# Patient Record
Sex: MALE | Race: WHITE | NOT HISPANIC OR LATINO | Employment: OTHER | URBAN - METROPOLITAN AREA
[De-identification: names, ages, dates, MRNs, and addresses within clinical notes are randomized per-mention and may not be internally consistent; named-entity substitution may affect disease eponyms.]

---

## 2017-01-24 ENCOUNTER — OFFICE VISIT - RIVER FALLS (OUTPATIENT)
Dept: FAMILY MEDICINE | Facility: CLINIC | Age: 52
End: 2017-01-24
Payer: COMMERCIAL

## 2017-01-24 ASSESSMENT — MIFFLIN-ST. JEOR: SCORE: 2168.45

## 2017-01-31 ENCOUNTER — OFFICE VISIT - RIVER FALLS (OUTPATIENT)
Dept: FAMILY MEDICINE | Facility: CLINIC | Age: 52
End: 2017-01-31
Payer: COMMERCIAL

## 2017-01-31 ASSESSMENT — MIFFLIN-ST. JEOR: SCORE: 2164.83

## 2017-10-25 ENCOUNTER — OFFICE VISIT - RIVER FALLS (OUTPATIENT)
Dept: FAMILY MEDICINE | Facility: CLINIC | Age: 52
End: 2017-10-25
Payer: COMMERCIAL

## 2017-10-25 ASSESSMENT — MIFFLIN-ST. JEOR: SCORE: 2151.22

## 2017-10-26 LAB
CREAT SERPL-MCNC: 1.15 MG/DL (ref 0.7–1.33)
GLUCOSE BLD-MCNC: 99 MG/DL (ref 65–99)

## 2019-07-01 ENCOUNTER — COMMUNICATION - RIVER FALLS (OUTPATIENT)
Dept: FAMILY MEDICINE | Facility: CLINIC | Age: 54
End: 2019-07-01
Payer: COMMERCIAL

## 2019-07-02 ENCOUNTER — OFFICE VISIT - RIVER FALLS (OUTPATIENT)
Dept: FAMILY MEDICINE | Facility: CLINIC | Age: 54
End: 2019-07-02
Payer: COMMERCIAL

## 2019-11-04 ENCOUNTER — HEALTH MAINTENANCE LETTER (OUTPATIENT)
Age: 54
End: 2019-11-04

## 2019-11-07 ENCOUNTER — COMMUNICATION - RIVER FALLS (OUTPATIENT)
Dept: FAMILY MEDICINE | Facility: CLINIC | Age: 54
End: 2019-11-07
Payer: COMMERCIAL

## 2019-12-10 ENCOUNTER — OFFICE VISIT - RIVER FALLS (OUTPATIENT)
Dept: FAMILY MEDICINE | Facility: CLINIC | Age: 54
End: 2019-12-10
Payer: COMMERCIAL

## 2019-12-10 ASSESSMENT — MIFFLIN-ST. JEOR: SCORE: 2053.24

## 2020-06-24 ENCOUNTER — COMMUNICATION - RIVER FALLS (OUTPATIENT)
Dept: FAMILY MEDICINE | Facility: CLINIC | Age: 55
End: 2020-06-24
Payer: COMMERCIAL

## 2020-08-18 ENCOUNTER — COMMUNICATION - RIVER FALLS (OUTPATIENT)
Dept: FAMILY MEDICINE | Facility: CLINIC | Age: 55
End: 2020-08-18
Payer: COMMERCIAL

## 2020-11-22 ENCOUNTER — HEALTH MAINTENANCE LETTER (OUTPATIENT)
Age: 55
End: 2020-11-22

## 2021-01-14 ENCOUNTER — COMMUNICATION - RIVER FALLS (OUTPATIENT)
Dept: FAMILY MEDICINE | Facility: CLINIC | Age: 56
End: 2021-01-14
Payer: COMMERCIAL

## 2021-01-29 ENCOUNTER — COMMUNICATION - RIVER FALLS (OUTPATIENT)
Dept: FAMILY MEDICINE | Facility: CLINIC | Age: 56
End: 2021-01-29
Payer: COMMERCIAL

## 2021-07-15 PROCEDURE — 87641 MR-STAPH DNA AMP PROBE: CPT | Performed by: PATHOLOGY

## 2021-07-16 ENCOUNTER — LAB REQUISITION (OUTPATIENT)
Dept: LAB | Facility: CLINIC | Age: 56
End: 2021-07-16

## 2021-07-17 LAB
MRSA DNA SPEC QL NAA+PROBE: NEGATIVE
SA TARGET DNA: NEGATIVE

## 2021-09-08 ENCOUNTER — COMMUNICATION - RIVER FALLS (OUTPATIENT)
Dept: FAMILY MEDICINE | Facility: CLINIC | Age: 56
End: 2021-09-08
Payer: COMMERCIAL

## 2021-09-18 ENCOUNTER — HEALTH MAINTENANCE LETTER (OUTPATIENT)
Age: 56
End: 2021-09-18

## 2022-01-08 ENCOUNTER — HEALTH MAINTENANCE LETTER (OUTPATIENT)
Age: 57
End: 2022-01-08

## 2022-02-11 VITALS
SYSTOLIC BLOOD PRESSURE: 130 MMHG | DIASTOLIC BLOOD PRESSURE: 92 MMHG | TEMPERATURE: 98.5 F | WEIGHT: 294.8 LBS | HEART RATE: 88 BPM | WEIGHT: 294 LBS | BODY MASS INDEX: 42.2 KG/M2 | HEIGHT: 70 IN | BODY MASS INDEX: 42.09 KG/M2 | HEIGHT: 70 IN

## 2022-02-12 VITALS
BODY MASS INDEX: 38.57 KG/M2 | WEIGHT: 269.4 LBS | OXYGEN SATURATION: 97 % | HEART RATE: 87 BPM | DIASTOLIC BLOOD PRESSURE: 76 MMHG | HEIGHT: 70 IN | SYSTOLIC BLOOD PRESSURE: 112 MMHG

## 2022-02-12 VITALS
DIASTOLIC BLOOD PRESSURE: 84 MMHG | SYSTOLIC BLOOD PRESSURE: 128 MMHG | TEMPERATURE: 98.2 F | HEIGHT: 70 IN | BODY MASS INDEX: 41.66 KG/M2 | WEIGHT: 291 LBS | HEART RATE: 100 BPM

## 2022-02-12 VITALS — DIASTOLIC BLOOD PRESSURE: 72 MMHG | SYSTOLIC BLOOD PRESSURE: 100 MMHG | WEIGHT: 261 LBS | BODY MASS INDEX: 37.45 KG/M2

## 2022-02-16 NOTE — TELEPHONE ENCOUNTER
Entered by Priti Barr CMA on January 29, 2021 12:27:38 PM CST  ---------------------  From: Priti Barr CMA   To: [facility]    Sent: 1/29/2021 12:27:37 PM CST  Subject: Medication Management     ** Not Approved: Prescriber not associated with location, resend prescriptions under Dr Vinicio Mcleod. **  hydrOXYzine (HYDROXYZINE PAMOATE 50MG CAPSULE)  TAKE 1 CAPSULE BY MOUTH EVERY EIGHT HOURS AS NEEDED  Qty:  90 EA        Days Supply:  90        Refills:  0          Substitutions Allowed     Route To Pharmacy - [facility]   Signed by Priti Barr CMA    ** Not Approved: Prescriber not associated with location, resend prescriptions under Dr Vinicio Mcleod. **  naltrexone (NALTREXONE HCL 50MG TABLET)  TAKE ONE TABLET BY MOUTH ONCE DAILY.  Qty:  30 EA        Days Supply:  30        Refills:  0          Substitutions Allowed     Route To Pharmacy - [facility]   Signed by Priti Barr CMA    ** Not Approved: Prescriber not associated with location, resend prescriptions under Dr Vinicio Mcleod. **  gabapentin (GABAPENTIN 300MG CAPSULE)  TAKE 1 CAPSULE BY MOUTH 3 TIMES A DAY  Qty:  90 EA        Days Supply:  30        Refills:  0          Substitutions Allowed     Route To Pharmacy - [facility]   Signed by Priti Barr CMA    ** Not Approved: Prescriber not associated with location, resend prescriptions under Dr Vinicio Mcleod. **  furosemide (FUROSEMIDE 40MG TABLET)  TAKE 1 TABLET BY MOUTH EVERY MORNING  Qty:  30 EA        Days Supply:  30        Refills:  0          Substitutions Allowed     Route To Pharmacy - [facility]   Signed by Priti Barr CMA    ** Not Approved: Prescriber not associated with location, resend prescriptions under Dr Vinicio Mcleod. **  FLUoxetine (FLUOXETINE HCL 20MG CAPSULE)  TAKE THREE CAPSULES BY MOUTH EVERY MORNING  Qty:  90 EA        Days Supply:  30        Refills:  0          Substitutions Allowed     Route To Pharmacy - [facility]   Signed by Priti Barr CMA    ** Not  Approved: Prescriber not associated with location, resend prescriptions under Dr Vinicio Mcleod. **  acetaminophen (NON-ASPIRIN PAIN RELIEVER 325MG TABLET)  TAKE 2 TABLETS BY MOUTH EVERY FOUR HOURS AS NEEDED  Qty:  60 EA        Days Supply:  60        Refills:  0          Substitutions Allowed     Route To Pharmacy - [facility]   Signed by Priti Barr CMA    ** Not Approved: Prescriber not associated with location, resend prescriptions under Dr Vinicio Mcleod. **  folic acid (FOLIC ACID 1000MCG TABLET)  TAKE ONE TABLET BY MOUTH ONCE DAILY  Qty:  30 EA        Days Supply:  30        Refills:  0          Substitutions Allowed     Route To Pharmacy - [facility]   Signed by Priti Barr CMA    ** Not Approved: Prescriber not associated with location, resend prescriptions under Dr Vinicio Mcleod. **  Miscellaneous Prescription (B-COMPLEX W/C  TABLET)  TAKE 1 TABLET BY MOUTH ONCE DAILY  Qty:  30 EA        Days Supply:  30        Refills:  0          Substitutions Allowed     Route To Pharmacy - [facility]   Signed by Priti Barr CMA    ** Not Approved: Prescriber not associated with location, resend prescriptions under Dr Vinicio Mcleod. **  ascorbic acid-carbonyl iron (VITRON-C  TABLET)  TAKE 1 TABLET BY MOUTH TWICE DAILY FOR HEMOGLOBIN  Qty:  60 EA        Days Supply:  30        Refills:  0          Substitutions Allowed     Route To Pharmacy - [facility]   Signed by Priti Barr CMA    ** Not Approved: Prescriber not associated with location, resend prescriptions under Dr Vinicio Mcleod. **  omeprazole (OMEPRAZOLE 20MG CAPSULE )  TAKE 1 CAPSULE BY MOUTH TWICE DAILY BEFORE MEALS  Qty:  60 EA        Days Supply:  30        Refills:  0          Substitutions Allowed     Route To Pharmacy - [facility]   Signed by Priti Barr CMA    ** Not Approved: Prescriber not associated with location, resend prescriptions under Dr Vinicio Mcleod. **  propranolol (PROPRANOLOL HYDROCHLORIDE 10MG TABLET)  TAKE ONE TABLET  BY MOUTH TWICE DAILY  Qty:  60 EA        Days Supply:  30        Refills:  0          Substitutions Allowed     Route To Pharmacy - [facility]   Signed by Priti Barr CMA    ** Not Approved: Prescriber not associated with location, resend prescriptions under Dr Vinicio Mcleod. **  gabapentin (GABAPENTIN 300MG CAPSULE)  TAKE ONE CAPSULE BY MOUTH THREE TIMES A DAY  Qty:  270 EA        Days Supply:  90        Refills:  1          Substitutions Allowed     Route To Pharmacy - [facility]   Signed by Priti Barr CMA            ** Patient matched by Priti Barr CMA on 1/29/2021 12:26:03 PM CST **      ------------------------------------------  From: [facility]  To: Cece Pettit MD  Sent: January 29, 2021 12:18:28 PM CST  Subject: Medication Management  Due: January 22, 2021 9:58:09 AM CST     ** On Hold Pending Signature **     Drug: hydrOXYzine (hydrOXYzine pamoate 50 mg oral capsule), TAKE 1 CAPSULE BY MOUTH EVERY EIGHT HOURS AS NEEDED  Quantity: 90 EA  Days Supply: 90  Refills: 0  Substitutions Allowed  Notes from Pharmacy:     Dispensed Drug: hydrOXYzine (hydrOXYzine pamoate 50 mg oral capsule), TAKE 1 CAPSULE BY MOUTH EVERY EIGHT HOURS AS NEEDED  Quantity: 90 EA  Days Supply: 90  Refills: 0  Substitutions Allowed  Notes from Pharmacy:     ** On Hold Pending Signature **     Drug: naltrexone (naltrexone 50 mg oral tablet), TAKE ONE TABLET BY MOUTH ONCE DAILY.  Quantity: 30 EA  Days Supply: 30  Refills: 0  Substitutions Allowed  Notes from Pharmacy:     Dispensed Drug: naltrexone (naltrexone 50 mg oral tablet), TAKE ONE TABLET BY MOUTH ONCE DAILY.  Quantity: 30 EA  Days Supply: 30  Refills: 0  Substitutions Allowed  Notes from Pharmacy:     ** On Hold Pending Signature **     Drug: gabapentin (gabapentin 300 mg oral capsule), TAKE 1 CAPSULE BY MOUTH 3 TIMES A DAY  Quantity: 90 EA  Days Supply: 30  Refills: 0  Substitutions Allowed  Notes from Pharmacy:     Dispensed Drug: gabapentin  (gabapentin 300 mg oral capsule), TAKE 1 CAPSULE BY MOUTH 3 TIMES A DAY  Quantity: 90 EA  Days Supply: 30  Refills: 0  Substitutions Allowed  Notes from Pharmacy:     ** On Hold Pending Signature **     Drug: furosemide (furosemide 40 mg oral tablet), TAKE 1 TABLET BY MOUTH EVERY MORNING  Quantity: 30 EA  Days Supply: 30  Refills: 0  Substitutions Allowed  Notes from Pharmacy:     Dispensed Drug: furosemide (furosemide 40 mg oral tablet), TAKE 1 TABLET BY MOUTH EVERY MORNING  Quantity: 30 EA  Days Supply: 30  Refills: 0  Substitutions Allowed  Notes from Pharmacy:     ** On Hold Pending Signature **     Drug: FLUoxetine (FLUoxetine 20 mg oral capsule), TAKE THREE CAPSULES BY MOUTH EVERY MORNING  Quantity: 90 EA  Days Supply: 30  Refills: 0  Substitutions Allowed  Notes from Pharmacy:     Dispensed Drug: FLUoxetine (FLUoxetine 20 mg oral capsule), TAKE THREE CAPSULES BY MOUTH EVERY MORNING  Quantity: 90 EA  Days Supply: 30  Refills: 0  Substitutions Allowed  Notes from Pharmacy:     ** On Hold Pending Signature **     Drug: acetaminophen (acetaminophen 325 mg oral tablet), TAKE 2 TABLETS BY MOUTH EVERY FOUR HOURS AS NEEDED  Quantity: 60 EA  Days Supply: 60  Refills: 0  Substitutions Allowed  Notes from Pharmacy:     Dispensed Drug: acetaminophen (acetaminophen 325 mg oral tablet), TAKE 2 TABLETS BY MOUTH EVERY FOUR HOURS AS NEEDED  Quantity: 60 EA  Days Supply: 60  Refills: 0  Substitutions Allowed  Notes from Pharmacy:     ** On Hold Pending Signature **     Drug: folic acid (folic acid 1 mg oral tablet), TAKE ONE TABLET BY MOUTH ONCE DAILY  Quantity: 30 EA  Days Supply: 30  Refills: 0  Substitutions Allowed  Notes from Pharmacy:     Dispensed Drug: folic acid (folic acid 1 mg oral tablet), TAKE ONE TABLET BY MOUTH ONCE DAILY  Quantity: 30 EA  Days Supply: 30  Refills: 0  Substitutions Allowed  Notes from Pharmacy:     ** On Hold Pending Signature **     Drug: B-COMPLEX W/C TABLET, TAKE 1 TABLET BY MOUTH ONCE  DAILY  Quantity: 30 EA  Days Supply: 30  Refills: 0  Substitutions Allowed  Notes from Pharmacy:     Dispensed Drug: B-COMPLEX W/C TABLET, TAKE 1 TABLET BY MOUTH ONCE DAILY  Quantity: 30 EA  Days Supply: 30  Refills: 0  Substitutions Allowed  Notes from Pharmacy:     ** On Hold Pending Signature **     Drug: ascorbic acid-carbonyl iron (Vitron-C 125 mg-65 mg oral tablet), TAKE 1 TABLET BY MOUTH TWICE DAILY FOR HEMOGLOBIN  Quantity: 60 EA  Days Supply: 30  Refills: 0  Substitutions Allowed  Notes from Pharmacy:     Dispensed Drug: ascorbic acid-carbonyl iron (Vitron-C 125 mg-65 mg oral tablet), TAKE 1 TABLET BY MOUTH TWICE DAILY FOR HEMOGLOBIN  Quantity: 60 EA  Days Supply: 30  Refills: 0  Substitutions Allowed  Notes from Pharmacy:     ** On Hold Pending Signature **     Drug: omeprazole (omeprazole 20 mg oral delayed release capsule), TAKE 1 CAPSULE BY MOUTH TWICE DAILY BEFORE MEALS  Quantity: 60 EA  Days Supply: 30  Refills: 0  Substitutions Allowed  Notes from Pharmacy:     Dispensed Drug: omeprazole (omeprazole 20 mg oral delayed release capsule), TAKE 1 CAPSULE BY MOUTH TWICE DAILY BEFORE MEALS  Quantity: 60 EA  Days Supply: 30  Refills: 0  Substitutions Allowed  Notes from Pharmacy:     ** On Hold Pending Signature **     Drug: propranolol (propranolol 10 mg oral tablet), TAKE ONE TABLET BY MOUTH TWICE DAILY  Quantity: 60 EA  Days Supply: 30  Refills: 0  Substitutions Allowed  Notes from Pharmacy:     Dispensed Drug: propranolol (propranolol 10 mg oral tablet), TAKE ONE TABLET BY MOUTH TWICE DAILY  Quantity: 60 EA  Days Supply: 30  Refills: 0  Substitutions Allowed  Notes from Pharmacy:     ** On Hold Pending Signature **     Drug: gabapentin (gabapentin 300 mg oral capsule), TAKE ONE CAPSULE BY MOUTH THREE TIMES A DAY  Quantity: 270 EA  Days Supply: 90  Refills: 1  Substitutions Allowed  Notes from Pharmacy:     Dispensed Drug: gabapentin (gabapentin 300 mg oral capsule), TAKE ONE CAPSULE BY MOUTH THREE TIMES  A DAY  Quantity: 270 EA  Days Supply: 90  Refills: 1  Substitutions Allowed  Notes from Pharmacy:  ------------------------------------------

## 2022-02-16 NOTE — NURSING NOTE
Comprehensive Intake Entered On:  7/2/2019 11:01 AM CDT    Performed On:  7/2/2019 10:56 AM CDT by Keiko Barnett CMA               Summary   Chief Complaint :   cardio consult   Advance Directive :   No   Weight Measured :   261 lb(Converted to: 261 lb 0 oz, 118.39 kg)    Systolic Blood Pressure :   100 mmHg   Diastolic Blood Pressure :   72 mmHg   Mean Arterial Pressure :   81 mmHg   BP Site :   Right arm   Pulse Site :   Radial artery   BP Method :   Manual   HR Method :   Electronic   Race :      Languages :   English   Ethnicity :   Not  or    Keiko Barnett CMA - 7/2/2019 10:56 AM CDT   Health Status   Allergies Verified? :   Yes   Medication History Verified? :   Yes   Pre-Visit Planning Status :   Completed   Keiko Barnett CMA - 7/2/2019 10:56 AM CDT   Meds / Allergies   (As Of: 7/2/2019 11:01:17 AM CDT)   Allergies (Active)   No known allergies  Estimated Onset Date:   Unspecified ; Created By:   Charla Rodriguez CMA; Reaction Status:   Active ; Category:   Drug ; Substance:   No known allergies ; Type:   Allergy ; Updated By:   Charla Rodriguez CMA; Reviewed Date:   10/25/2017 11:28 AM CDT        Medication List   (As Of: 7/2/2019 11:01:17 AM CDT)   Prescription/Discharge Order    FLUoxetine  :   FLUoxetine ; Status:   Prescribed ; Ordered As Mnemonic:   FLUoxetine 60 mg oral tablet ; Simple Display Line:   60 mg, 1 tab(s), po, qam, 30 tab(s), 0 Refill(s) ; Ordering Provider:   Marii Bell; Catalog Code:   FLUoxetine ; Order Dt/Tm:   3/30/2018 11:09:46 AM          folic acid  :   folic acid ; Status:   Prescribed ; Ordered As Mnemonic:   folic acid 1 mg oral tablet ; Simple Display Line:   1 mg, 1 tab(s), po, daily, 90 tab(s), 3 Refill(s) ; Ordering Provider:   Marii Bell; Catalog Code:   folic acid ; Order Dt/Tm:   11/1/2017 3:40:53 PM          gabapentin  :   gabapentin ; Status:   Prescribed ; Ordered As Mnemonic:   gabapentin 400 mg oral capsule ; Simple  Display Line:   400 mg, 1 cap(s), po, qid, 120 cap(s), 0 Refill(s) ; Ordering Provider:   Marii Bell; Catalog Code:   gabapentin ; Order Dt/Tm:   3/30/2018 11:12:04 AM          hydrOXYzine  :   hydrOXYzine ; Status:   Prescribed ; Ordered As Mnemonic:   hydrOXYzine pamoate 50 mg oral capsule ; Simple Display Line:   50 mg, 1 cap(s), po, qid, PRN: as needed for anxiety, 120 cap(s), 0 Refill(s) ; Ordering Provider:   Marii Bell; Catalog Code:   hydrOXYzine ; Order Dt/Tm:   3/30/2018 11:11:15 AM          lisinopril  :   lisinopril ; Status:   Prescribed ; Ordered As Mnemonic:   lisinopril 10 mg oral tablet ; Simple Display Line:   1 tab(s), Oral, daily, 30 tab(s), 0 Refill(s) ; Ordering Provider:   Marii Bell; Catalog Code:   lisinopril ; Order Dt/Tm:   1/11/2019 10:39:18 AM          Miscellaneous Prescription  :   Miscellaneous Prescription ; Status:   Prescribed ; Ordered As Mnemonic:   MAGNESIUM OXIDE 500 MG TABLET ; Simple Display Line:   See Instructions, TAKE ONE TABLET BY MOUTH AT BEDTIME, 30 unknown unit ; Ordering Provider:   Cece Pettit MD; Catalog Code:   Miscellaneous Prescription ; Order Dt/Tm:   6/4/2015 6:58:42 PM          nicotine  :   nicotine ; Status:   Prescribed ; Ordered As Mnemonic:   nicotine 14 mg/24 hr transdermal film, extended release ; Simple Display Line:   1 patch(es), TOP, Daily, for 14 day(s), he can call in 2 weeks and let me know how this is going then can taper--MUST NOT SMOKE With patch on, 14 patch(es), 0 Refill(s) ; Ordering Provider:   Marii Bell; Catalog Code:   nicotine ; Order Dt/Tm:   10/25/2017 11:38:05 AM          pantoprazole  :   pantoprazole ; Status:   Prescribed ; Ordered As Mnemonic:   pantoprazole 40 mg oral delayed release tablet ; Simple Display Line:   40 mg, 1 tab(s), po, daily, 30 tab(s), 0 Refill(s) ; Ordering Provider:   Marii Bell; Catalog Code:   pantoprazole ; Order Dt/Tm:   3/30/2018 11:10:42 AM            Home  Meds    ascorbic acid  :   ascorbic acid ; Status:   Documented ; Ordered As Mnemonic:   Vitamin C ; Simple Display Line:   0 Refill(s) ; Catalog Code:   ascorbic acid ; Order Dt/Tm:   3/8/2016 11:36:43 AM          cyanocobalamin  :   cyanocobalamin ; Status:   Documented ; Ordered As Mnemonic:   Vitamin B12 1000 mcg oral tablet ; Simple Display Line:   1,000 mcg, 1 tab(s), po, hs, 0 Refill(s) ; Catalog Code:   cyanocobalamin ; Order Dt/Tm:   6/28/2016 1:29:11 PM          polyethylene glycol 3350 with electrolytes  :   polyethylene glycol 3350 with electrolytes ; Status:   Documented ; Ordered As Mnemonic:   polyethylene glycol 3350 with electrolytes oral powder for reconstitution ; Simple Display Line:   17g, po, daily ; Catalog Code:   polyethylene glycol 3350 with electrolyt ; Order Dt/Tm:   4/15/2015 8:12:55 AM          thiamine  :   thiamine ; Status:   Documented ; Ordered As Mnemonic:   Vitamin B1 ; Simple Display Line:   250 mg, po, daily ; Catalog Code:   thiamine ; Order Dt/Tm:   7/15/2015 1:16:24 PM          vitamin E  :   vitamin E ; Status:   Documented ; Ordered As Mnemonic:   vitamin E ; Simple Display Line:   po, daily, 0 Refill(s) ; Catalog Code:   vitamin E ; Order Dt/Tm:   3/8/2016 11:36:47 AM

## 2022-02-16 NOTE — TELEPHONE ENCOUNTER
Entered by Priti Barr CMA on January 29, 2021 9:33:15 AM CST  ---------------------  From: Priti Barr CMA   To: [facility]    Sent: 1/29/2021 9:33:14 AM CST  Subject: Medication Management     ** Not Approved: Prescriber not associated with location, please resend refill request under Dr Vinicio Mcleod. **  hydrOXYzine (HYDROXYZINE PAMOATE 50MG CAPSULE)  TAKE 1 CAPSULE BY MOUTH EVERY EIGHT HOURS AS NEEDED  Qty:  90 EA        Days Supply:  90        Refills:  0          Substitutions Allowed     Route To Pharmacy - [facility]   Signed by Priti Barr CMA            ** Not Approved: Prescriber not associated with location, please resend refill request under Dr Vinicio Mcleod. **  hydrOXYzine (HYDROXYZINE PAMOATE 50MG CAPSULE)  TAKE 1 CAPSULE BY MOUTH EVERY EIGHT HOURS AS NEEDED  Qty:  90 EA        Days Supply:  90        Refills:  0          Substitutions Allowed     Route To Pharmacy - [facility]   Signed by Priti Barr CMA              ** Patient matched by Priti Barr CMA on 1/29/2021 9:26:52 AM CST **      ------------------------------------------  From: [facility]  To: Cece Pettit MD  Sent: January 29, 2021 9:03:47 AM CST  Subject: Medication Management  Due: January 22, 2021 9:58:08 AM CST     ** On Hold Pending Signature **     Drug: hydrOXYzine (hydrOXYzine pamoate 50 mg oral capsule), TAKE 1 CAPSULE BY MOUTH EVERY EIGHT HOURS AS NEEDED  Quantity: 90 EA  Days Supply: 90  Refills: 0  Substitutions Allowed  Notes from Pharmacy:     Dispensed Drug: hydrOXYzine (hydrOXYzine pamoate 50 mg oral capsule), TAKE 1 CAPSULE BY MOUTH EVERY EIGHT HOURS AS NEEDED  Quantity: 90 EA  Days Supply: 90  Refills: 0  Substitutions Allowed  Notes from Pharmacy:     ** On Hold Pending Signature **     Drug: hydrOXYzine (hydrOXYzine pamoate 50 mg oral capsule), TAKE 1 CAPSULE BY MOUTH EVERY EIGHT HOURS AS NEEDED  Quantity: 90 EA  Days Supply: 90  Refills: 0  Substitutions Allowed  Notes from  Pharmacy:     Dispensed Drug: hydrOXYzine (hydrOXYzine pamoate 50 mg oral capsule), TAKE 1 CAPSULE BY MOUTH EVERY EIGHT HOURS AS NEEDED  Quantity: 90 EA  Days Supply: 90  Refills: 0  Substitutions Allowed  Notes from Pharmacy:  ------------------------------------------

## 2022-02-16 NOTE — TELEPHONE ENCOUNTER
Entered by Tamir Kennedy CMA on January 14, 2021 3:17:46 PM CST  ---------------------  From: Tamir Kennedy CMA   To: [facility]    Sent: 1/14/2021 3:17:41 PM CST  Subject: Medication Management     ** Not Approved: Patient no longer under Prescriber care **  Miscellaneous Prescription (VITAMIN D3 50MCG TABLET)  TAKE ONE TABLET BY MOUTH ONCE DAILY  Qty:  30 EA        Days Supply:  100        Refills:  0          Substitutions Allowed     Route To Pharmacy - [facility]   Signed by Tamir Kennedy CMA            ** Patient matched by Tamir Kennedy CMA on 1/14/2021 3:16:34 PM CST **      ------------------------------------------  From: [facility]  To: Torres Stewart MD  Sent: January 14, 2021 2:42:28 PM CST  Subject: Medication Management  Due: January 15, 2021 10:08:17 AM CST     ** On Hold Pending Signature **     Drug: VITAMIN D3 50MCG TABLET, TAKE ONE TABLET BY MOUTH ONCE DAILY  Quantity: 30 EA  Days Supply: 100  Refills: 0  Substitutions Allowed  Notes from Pharmacy:     Dispensed Drug: VITAMIN D3 50MCG TABLET, TAKE ONE TABLET BY MOUTH ONCE DAILY  Quantity: 30 EA  Days Supply: 100  Refills: 0  Substitutions Allowed  Notes from Pharmacy:  ------------------------------------------

## 2022-02-16 NOTE — TELEPHONE ENCOUNTER
Entered by Sallie Mercer CMA on June 24, 2020 11:19:38 AM CDT  ---------------------  From: Sallie Mercer CMA   To: [facility]    Sent: 6/24/2020 11:19:32 AM CDT  Subject: Medication Management     ** Not Approved: Patient no longer under Prescriber care **  folic acid (FOLIC ACID  TAB 1MG - WW TABLET)  TAKE ONE TABLET BY MOUTH ONCE DAILY  Qty:  90 unknown unit        Days Supply:  0        Refills:  0          Substitutions Allowed     Route To Pharmacy - [facility]   Signed by Sallie Mercer CMA            ** Patient matched by Sallie Mercer CMA on 6/24/2020 11:17:27 AM CDT **      ------------------------------------------  From: [facility]  To: Marii Bell  Sent: June 22, 2020 10:41:04 AM CDT  Subject: Medication Management  Due: June 17, 2020 4:18:41 PM CDT     ** On Hold Pending Signature **     Dispensed Drug: folic acid (folic acid 1 mg oral tablet), TAKE ONE TABLET BY MOUTH ONCE DAILY  Quantity: 90 unknown unit  Days Supply: 0  Refills: 0  Substitutions Allowed  Notes from Pharmacy:  ------------------------------------------

## 2022-02-16 NOTE — TELEPHONE ENCOUNTER
Entered by Kenyetta Mg CMA on January 29, 2021 12:36:38 PM CST  ---------------------  From: Kenyetta Mg CMA   To: [facility]    Sent: 1/29/2021 12:36:33 PM CST  Subject: Medication Management     ** Not Approved: Patient no longer under Prescriber care, hasn't been seen since 10/2017 **  Miscellaneous Prescription (VITAMIN D3 50MCG TABLET)  TAKE ONE TABLET BY MOUTH ONCE DAILY  Qty:  30 EA        Days Supply:  100        Refills:  0          Substitutions Allowed     Route To Pharmacy - [facility]   Signed by Kenyetta Mg CMA            ** Patient matched by Kenyetta Mg CMA on 1/29/2021 12:29:02 PM CST **      ------------------------------------------  From: [facility]  To: Torres Stewart MD  Sent: January 29, 2021 12:20:28 PM CST  Subject: Medication Management  Due: January 22, 2021 9:58:09 AM CST     ** On Hold Pending Signature **     Drug: VITAMIN D3 50MCG TABLET, TAKE ONE TABLET BY MOUTH ONCE DAILY  Quantity: 30 EA  Days Supply: 100  Refills: 0  Substitutions Allowed  Notes from Pharmacy:     Dispensed Drug: VITAMIN D3 50MCG TABLET, TAKE ONE TABLET BY MOUTH ONCE DAILY  Quantity: 30 EA  Days Supply: 100  Refills: 0  Substitutions Allowed  Notes from Pharmacy:  ------------------------------------------

## 2022-02-16 NOTE — TELEPHONE ENCOUNTER
Entered by Akiko Lopez MA on September 08, 2021 8:46:08 AM CDT  ---------------------  From: Akiko Lopez MA   To: [facility]    Sent: 9/8/2021 8:46:08 AM CDT  Subject: Medication Management     ** Not Approved: Prescriber not associated with location **  potassium chloride (POTASSIUM CHLORIDE ER 20MEQ ER TABLET ER)  TAKE ONE TABLET BY MOUTH TWO TIMES DAILY WITH MEALS FOR 30 DAYS.  Qty:  360 tab(s)        Days Supply:  180        Refills:  0          Substitutions Allowed     Route To Pharmacy - [facility]   Signed by Akiko Lopez MA            ** Patient matched by Akiko Lopez MA on 9/8/2021 8:45:17 AM CDT **      ------------------------------------------  From: [facility]  To: Cece Pettit MD  Sent: September 8, 2021 8:44:03 AM CDT  Subject: Medication Management  Due: August 20, 2021 11:16:59 AM CDT     ** On Hold Pending Signature **     Drug: potassium chloride (Potassium Chloride (Eqv-Klor-Con M20) 20 mEq oral tablet, extended release), TAKE ONE TABLET BY MOUTH TWO TIMES DAILY WITH MEALS FOR 30 DAYS.  Quantity: 360 tab(s)  Days Supply: 180  Refills: 0  Substitutions Allowed  Notes from Pharmacy:     Dispensed Drug: potassium chloride (Potassium Chloride (Eqv-Klor-Con M20) 20 mEq oral tablet, extended release), TAKE ONE TABLET BY MOUTH TWO TIMES DAILY WITH MEALS FOR 30 DAYS.  Quantity: 360 tab(s)  Days Supply: 180  Refills: 0  Substitutions Allowed  Notes from Pharmacy:  ------------------------------------------

## 2022-02-16 NOTE — PROGRESS NOTES
Patient:   ROSA RAMIREZ JR            MRN: [id number]            FIN: [id number]               Age:   51 years     Sex:  Male     :  1965   Associated Diagnoses:   Obesity; Hypertension   Author:   Nancy Gaxiola      Visit Information   Visit type:  Medical Nutrition Therapy.    Referral source:  Marii Bell.       Chief Complaint   Morbid Obesity       Interval History   Pt completes all of his own cooking and grocery shopping.  Eating out rarely.  Does not consume sugar sweetened beverages.  occasional sweets - not daily  Portion control and balance of food groups are patients main areas to work on.    am: english muffin with jelly and PB, occasionally eggs  noon: sandwich with meat, harman, cheese or leftovers  Evening: hamburger helper or chicken breast and starch, or other type of protein and starch  snacks: starchy snack (chips or pretzels or poporn); does try to have cottage cheese and fruit most days/ week    Activity: does try to walk for 15 - 20 min daily       Health Status   Allergies:    Allergic Reactions (Selected)  No known allergies   Medications:  (Selected)   Prescriptions  Prescribed  FLUoxetine 40 mg oral capsule: 1 cap(s) ( 40 mg ), PO, Daily, # 90 cap(s), 1 Refill(s), Type: Maintenance, Pharmacy: [facility], 1 cap(s) po daily  MAGNESIUM OXIDE 500 MG TABLET: See Instructions, Instructions: TAKE ONE TABLET BY MOUTH AT BEDTIME, # 30 unknown unit, 2 Refill(s), Pharmacy: [facility], TAKE ONE TABLET BY MOUTH AT BEDTIME  Vistaril 50 mg oral capsule: 1 cap(s) ( 50 mg ), PO, QID, PRN: for anxiety, # 360 cap(s), 1 Refill(s), Type: Maintenance, Pharmacy: [facility], 1 cap(s) po qid,PRN:for anxiety  folic acid 1 mg oral tablet: 1 tab(s) ( 1 mg ), po, daily, x 90 day(s), # 90 tab(s), 1 Refill(s), Type: Physician Stop, Pharmacy: [facility], 1 tab(s) po daily,x90 day(s)  gabapentin 400 mg oral capsule: 1 cap(s) ( 400 mg ), po, qid, # 360 cap(s), 1 Refill(s), Type: Maintenance,  Pharmacy: [facility], 1 cap(s) po qid  lisinopril 10 mg oral tablet: 1 tab(s) ( 10 mg ), po, daily, # 90 tab(s), 1 Refill(s), Type: Maintenance, Pharmacy: [facility], 1 tab(s) po daily  pantoprazole 40 mg oral delayed release tablet: 1 tab(s) ( 40 mg ), po, daily, # 90 tab(s), 1 Refill(s), Type: Maintenance, Pharmacy: [facility], 1 tab(s) po daily  Documented Medications  Documented  NexIUM 40 mg oral delayed release capsule: 1 cap(s) ( 40 mg ), po, bid, 0 Refill(s), Type: Maintenance  Vitamin B12 1000 mcg oral tablet: 1 tab(s) ( 1,000 mcg ), po, hs, 0 Refill(s), Type: Maintenance  Vitamin B1: ( 250 mg ), po, daily, 0 Refill(s), Type: Maintenance  Vitamin C: 0 Refill(s), Type: Maintenance  polyethylene glycol 3350 with electrolytes oral powder for reconstitution: 17g, po, daily, 0 Refill(s), Type: Maintenance  vitamin E: po, daily, 0 Refill(s), Type: Maintenance   Problem list:    All Problems  Alcohol abuse / SNOMED CT 94224659 / Confirmed  Polyneuropathy / SNOMED CT 82764258 / Confirmed  Anemia of chronic disease / SNOMED CT [id number] / Confirmed  Anxiety / SNOMED CT 85050938 / Confirmed  Disc degeneration, lumbar / SNOMED CT 47847296 / Confirmed  Hypertension / SNOMED CT 55496929 / Confirmed  Neurocognitive disorder, unspecified / SNOMED CT 6258200858 / Confirmed  Obesity / ICD-9-.00 / Probable  Moderate depressive disorder / SNOMED CT 055074069 / Confirmed  Smoker / SNOMED CT [id number] / Confirmed  Foraminal stenosis of cervical region / SNOMED CT 679652868 / Confirmed  TBI (traumatic brain injury) / SNOMED CT 618134 / Confirmed      Histories   Past Medical History:    Active  Neurocognitive disorder, unspecified (6691488874): Onset on 9/17/2015 at 49 years.  Smoker ([id number])  Moderate depressive disorder (816881043)  Hypertension (86822259)  Alcohol abuse (26423714)  Polyneuropathy (84270462)  Anemia of chronic  disease ([id number])  Resolved  *Hospitalized@[facility] - Alcohol withdrawal, Recent TBI: Onset on 4/19/2015 at 49 years.  Resolved on 4/21/2015 at 49 years.  *Hospitalized@[facility] - TBI: Onset on 4/11/2015 at 49 years.  Resolved on 4/14/2015 at 49 years.  *Hospitalized@[facility] - Acute kidney injury, severe: Onset on 3/30/2015 at 49 years.  Resolved on 4/6/2015 at 49 years.  *Hospitalized@[facility] - Alcohol withdrawal, acute diverticulitis: Onset on 3/19/2015 at 49 years.  Resolved on 3/26/2015 at 49 years.   Family History:    Diabetes mellitus - adult onset  Grandfather (P)  CAD - Coronary artery disease  Father     Procedure history:    Upper GI endoscopy (SNOMED CT 1657799879) on 6/20/2016 at 50 Years.  Comments:  6/23/2016 1:26 PM - Nalini Echevarria CMA  Medium-sized hiatus hernia  LA Grade A reflux esophagitis  No gross lesions in the stomach.  Colonoscopy (SNOMED CT 124178003) performed by Arturo Mcghee on 5/1/2015 at 49 Years.  Comments:  5/11/2015 4:05 PM - Ludy Renee RN  Sedation: MAC  Indication: lower abdominal pain, hematochezia  7mm sessile serrated adenoma, 8mm sessile serrated adenoma, 18mm tubular adenoma consistent with advanced adenoma due to size, hyperplastic polyp  Internal hemorrhoids.  Repeat in 3 years.  Cyst removal on chest.   Social History:        Alcohol Assessment            Current, 3-5 times per week                     Comments:                      02/03/2015 - Tamir Kennedy CMA                     3+ drinks per time      Tobacco Assessment            Current, Cigarettes, 10 per day.  30 year(s).      Substance Abuse Assessment            Never      Exercise and Physical Activity Assessment: Does not exercise      Sexual Assessment            Sexually active: No.  Sexual orientation: Homosexual.      Other Assessment                     Comments:                      01/30/2015 - Cece Pettit MD                      Unemployed.    Single.    No children.   + smoker.        Review / Management   Results review:  Lab results   1/24/2017 12:18 PM CST Sodium Level 138 mmol/L    Potassium Level 4.4 mmol/L    Chloride Level 104 mmol/L    CO2 Level 23 mmol/L    Glucose Level 104 mg/dL  HI    BUN 12 mg/dL    Creatinine 1.27 mg/dL    BUN/Creat Ratio NOT APPLICABLE    eGFR 65 mL/min/1.73m2    eGFR African American 75 mL/min/1.73m2    Calcium Level 9.5 mg/dL    Hgb A1c 5.5    Cholesterol 199 mg/dL    Non-    HDL 83 mg/dL    Chol/HDL Ratio 2.4    LDL 73    Triglyceride 213 mg/dL  HI   .    BP during office visit 144/96      Impression and Plan   Diagnosis     Obesity (ZJM33-MF E66.9).     Hypertension (NWI57-LJ I10).         Today patient was instructed on the following  1.  How nutrition and physical activity can impact weight status and improve overall health  2.  Reduce risks associated with obesity including diabetes and heart disease, heart healthy dietary guidelines  3.  Exercise recommendations as able - complete PT exercises and therapy bands  4.  Dietary recommendations for weight loss with calorie controlled eating, portion control, incorporating more fruit and vegetables to replace starches, potentially meal replacement drink.       Goals:   1.  Practice healthy stress management and get good quality sleep with the goal of 7-8 hours per night.    2.  Increase physical activity and make this a part of a daily routine.  Complete PT exercises as previously provided and use therapy bands   3.  Eat in a healthy way, per food plate method .  Eat 3 meals/ day.  A meal is three or more food groups; make it colorful for better nutrition.  Focus on portion control.  Follow weight loss tips and mindful eating. increase nonstarchy vegetables 3+ cups/ day, tuna weekly, fruit 2x/ day, nuts 1/4 cup   4.  Goal weight 265# in 6 mo   5.  Read handouts provided.             Professional Services   Time spent with pt 45 min   cc Marii Castillo  NP-C

## 2022-02-16 NOTE — TELEPHONE ENCOUNTER
Entered by Akiko Lopez MA on January 27, 2021 7:44:06 AM CST  ---------------------  From: Akiko Lopez MA   To: [facility]    Sent: 1/27/2021 7:44:06 AM CST  Subject: Medication Management     ** Not Approved: Patient no longer under Prescriber care **  Miscellaneous Prescription (VITAMIN D3 50MCG TABLET)  TAKE ONE TABLET BY MOUTH ONCE DAILY  Qty:  30 EA        Days Supply:  100        Refills:  0          Substitutions Allowed     Route To Pharmacy - [facility]   Signed by Akiko Lopez MA            ** Patient matched by Akiko Lopez MA on 1/27/2021 7:42:55 AM CST **      ------------------------------------------  From: [facility]  To: Torres Stewart MD  Sent: January 26, 2021 8:37:03 AM CST  Subject: Medication Management  Due: January 22, 2021 9:58:08 AM CST     ** On Hold Pending Signature **     Drug: VITAMIN D3 50MCG TABLET, TAKE ONE TABLET BY MOUTH ONCE DAILY  Quantity: 30 EA  Days Supply: 100  Refills: 0  Substitutions Allowed  Notes from Pharmacy:     Dispensed Drug: VITAMIN D3 50MCG TABLET, TAKE ONE TABLET BY MOUTH ONCE DAILY  Quantity: 30 EA  Days Supply: 100  Refills: 0  Substitutions Allowed  Notes from Pharmacy:  ------------------------------------------

## 2022-02-16 NOTE — TELEPHONE ENCOUNTER
Entered by Kenyetta Mg CMA on September 08, 2021 2:02:37 PM CDT  ---------------------  From: Kenyetta Mg CMA   To: [facility]    Sent: 9/8/2021 2:02:33 PM CDT  Subject: Medication Management     ** Not Approved: Patient no longer under Prescriber care **  potassium chloride (POTASSIUM CHLORIDE ER 20MEQ ER TABLET ER)  TAKE ONE TABLET BY MOUTH TWO TIMES DAILY WITH MEALS FOR 30 DAYS.  Qty:  360 tab(s)        Days Supply:  180        Refills:  0          Substitutions Allowed     Route To Pharmacy - [facility]   Signed by Kenyetta Mg CMA            ** Not Approved: Patient no longer under Prescriber care **  calcium carbonate-magnesium chloride (MAG64 64MG TABLET DR)  TAKE TWO TABLETS BY MOUTH TWO TIMES DAILY FOR 30 DAYS.  Qty:  120 tab(s)        Days Supply:  30        Refills:  11          Substitutions Allowed     Route To Pharmacy - [facility]   Signed by Kenyetta Mg CMA            ** Patient matched by Kenyetta Mg CMA on 9/8/2021 1:59:55 PM CDT **      ------------------------------------------  From: [facility] [place]  To: Torres Stewart MD  Sent: September 7, 2021 11:29:47 AM CDT  Subject: Medication Management  Due: August 20, 2021 11:16:59 AM CDT     ** On Hold Pending Signature **     Drug: calcium carbonate-magnesium chloride (Mag64 oral delayed release tablet), TAKE TWO TABLETS BY MOUTH TWO TIMES DAILY FOR 30 DAYS.  Quantity: 120 tab(s)  Days Supply: 30  Refills: 11  Substitutions Allowed  Notes from Pharmacy:     Dispensed Drug: calcium carbonate-magnesium chloride (Mag64 oral delayed release tablet), TAKE TWO TABLETS BY MOUTH TWO TIMES DAILY FOR 30 DAYS.  Quantity: 120 tab(s)  Days Supply: 30  Refills: 11  Substitutions Allowed  Notes from Pharmacy:     ** On Hold Pending Signature **     Drug: potassium chloride (Potassium Chloride (Eqv-Klor-Con M20) 20 mEq oral tablet, extended release), TAKE ONE TABLET BY MOUTH TWO TIMES DAILY WITH MEALS FOR 30 DAYS.  Quantity: 360 tab(s)  Days Supply:  180  Refills: 0  Substitutions Allowed  Notes from Pharmacy:     Dispensed Drug: potassium chloride (Potassium Chloride (Eqv-Klor-Con M20) 20 mEq oral tablet, extended release), TAKE ONE TABLET BY MOUTH TWO TIMES DAILY WITH MEALS FOR 30 DAYS.  Quantity: 360 tab(s)  Days Supply: 180  Refills: 0  Substitutions Allowed  Notes from Pharmacy:  ------------------------------------------

## 2022-02-16 NOTE — TELEPHONE ENCOUNTER
Entered by Kenyetta Mg CMA on January 11, 2019 10:39:44 AM CST  ---------------------  From: Kenyetta Mg CMA   To: [facility]    Sent: 1/11/2019 10:39:44 AM CST  Subject: Medication Management     ** Submitted: **  Order:lisinopril (lisinopril 10 mg oral tablet)  1 tab(s)  Oral  daily  Qty:  30 tab(s)        Refills:  0          Substitutions Allowed     Route To Pharmacy - [facility]    Signed by Kenyetta Mg CMA  1/11/2019 10:37:00 AM    ** Submitted: **  Complete:Case Management Consult (Request)  Details:           Signed by Kenyetta Mg CMA  1/11/2019 10:37:00 AM    ** Submitted: **  Complete:lisinopril (lisinopril 10 mg oral tablet)   Signed by Kenyetta Mg CMA  1/11/2019 10:39:00 AM    ** Not Approved:  **  lisinopril (LISINOPRIL 10MG TABLET)  TAKE ONE TABLET BY MOUTH ONCE DAILY  Qty:  90 unknown unit        Days Supply:  0        Refills:  0          MONTSE     Route To Pharmacy - [facility]   Signed by Kenyetta Mg CMA            ** Patient matched by Kenyetta Mg CMA on 1/11/2019 10:35:55 AM CST **      ------------------------------------------  From: [facility]  To: Marii Bell  Sent: January 10, 2019 3:04:33 PM CST  Subject: Medication Management  Due: January 11, 2019 3:04:33 PM CST    ** On Hold Pending Signature **  Drug: lisinopril (lisinopril 10 mg oral tablet)  TAKE ONE TABLET BY MOUTH ONCE DAILY  Quantity: 90 unknown unit  Days Supply: 0         Refills: 0  Substitutions Allowed  Notes from Pharmacy:     Dispensed Drug: lisinopril (lisinopril 10 mg oral tablet)  TAKE ONE TABLET BY MOUTH ONCE DAILY  Quantity: 90 unknown unit  Days Supply: 0         Refills: 0  Substitutions Allowed  Notes from Pharmacy:   ------------------------------------------Med Refill      Date of last office visit and reason:  10/25/17; HTN      Date of last Med Check / Px:   10/25/17  Date of last labs pertaining to med:  10/25/17    RTC order in chart:  yes; due    For Protocol refill, has patient been  contacted:  yes; LMTCB

## 2022-02-16 NOTE — PROGRESS NOTES
Patient:   ROSA RAMIREZ JR            MRN: [id number]            FIN: [id number]               Age:   51 years     Sex:  Male     :  1965   Associated Diagnoses:   Hypertension; Obesity; DM (diabetes mellitus screen); Polyneuropathy; Foraminal stenosis of cervical region; Alcohol abuse; Disc degeneration, lumbar; Smoker   Author:   Marii Bell      Visit Information      Date of Service: 2017 11:01 am  Performing Location: [facility]  Encounter#: [id number]      Primary Care Provider (PCP):  Vinicio Mcleod MD    NPI# [id number]      Referring Provider:  No referring provider recorded for selected visit.      Chief Complaint   2017 11:28 AM CST   HTN/ depression med check/refills      History of Present Illness   Confirmed symptoms and concerns with patient as presented in CC above. here to establis care.  The patient is here to establish care with me.  He is currently being followed here for some chronic diseases.  He did have some lab work last fall but feels that there is additional lab work that should be done today.  1. Obesity.  He is concerned that his weight continues to go up.  He has a family history of diabetes.  He admits that he does not do much activity during the day.  His legs are numb due to neuropathy and he has some jerking movements that make exercise difficult.  2. He has hypertension.  He is gaining weight.  His blood pressure is even a little higher today than it had been.  It has been nearly a year since his last lipid screen and he would like to repeat this today.  He has a history of a low sodium so I will repeat his basic metabolic panel as well as a screening A1c.  3. He is a smoker.  He smokes about a half pack per day.  Certainly this contributes to his hypertension.  He has tried Chantix in the past without success.  4. He is an alcoholic.  He lives alone and is on disability.  The disability is related to his traumatic brain injury and disease  that affects his extremities as well as anxiety and depression.  He did go through treatment for alcoholism two years ago.  He admits that he continues to drink; usually a beer daily but assures me never more than three beers daily.  He states that his parents who live close by keep a close eye on him and would be devastated if he started drinking heavily again.           5.   He feels that his depression/anxiety is fairly well-controlled.  His PHQ-9 score is 3.  He is currently taking medications.  .      Review of Systems   Constitutional:  No fever, No chills.    Ear/Nose/Mouth/Throat:  No ear pain, No nasal congestion, No sore throat.    Respiratory:  No shortness of breath, No cough, No wheezing.    Gastrointestinal:  No nausea, No vomiting, No diarrhea.             Health Status   Allergies:    Allergic Reactions (Selected)  No known allergies   Medications:  (Selected)   Prescriptions  Prescribed  FLUoxetine 40 mg oral capsule: 1 cap(s) ( 40 mg ), PO, Daily, # 90 cap(s), 1 Refill(s), Type: Maintenance, Pharmacy: [facility], 1 cap(s) po daily  MAGNESIUM OXIDE 500 MG TABLET: See Instructions, Instructions: TAKE ONE TABLET BY MOUTH AT BEDTIME, # 30 unknown unit, 2 Refill(s), Pharmacy: [facility], TAKE ONE TABLET BY MOUTH AT BEDTIME  Vistaril 50 mg oral capsule: 1 cap(s) ( 50 mg ), PO, QID, PRN: for anxiety, # 360 cap(s), 1 Refill(s), Type: Maintenance, Pharmacy: [facility], 1 cap(s) po qid,PRN:for anxiety  folic acid 1 mg oral tablet: 1 tab(s) ( 1 mg ), po, daily, x 90 day(s), # 90 tab(s), 1 Refill(s), Type: Physician Stop, Pharmacy: [facility], 1 tab(s) po daily,x90 day(s)  gabapentin 400 mg oral capsule: 1 cap(s) ( 400 mg ), po, qid, # 360 cap(s), 1 Refill(s), Type: Maintenance, Pharmacy: [facility], 1 cap(s) po qid  lisinopril 10 mg oral tablet: 1 tab(s) ( 10 mg ), po, daily, # 90 tab(s), 1 Refill(s), Type: Maintenance, Pharmacy: [facility], 1 tab(s) po daily  pantoprazole 40 mg oral delayed  release tablet: 1 tab(s) ( 40 mg ), po, daily, # 90 tab(s), 1 Refill(s), Type: Maintenance, Pharmacy: [facility], 1 tab(s) po daily  Documented Medications  Documented  NexIUM 40 mg oral delayed release capsule: 1 cap(s) ( 40 mg ), po, bid, 0 Refill(s), Type: Maintenance  Vitamin B12 1000 mcg oral tablet: 1 tab(s) ( 1,000 mcg ), po, hs, 0 Refill(s), Type: Maintenance  Vitamin B1: ( 250 mg ), po, daily, 0 Refill(s), Type: Maintenance  Vitamin C: 0 Refill(s), Type: Maintenance  polyethylene glycol 3350 with electrolytes oral powder for reconstitution: 17g, po, daily, 0 Refill(s), Type: Maintenance  vitamin E: po, daily, 0 Refill(s), Type: Maintenance   Problem list:    All Problems  Foraminal stenosis of cervical region / SNOMED CT 533666272 / Confirmed  Polyneuropathy / SNOMED CT 75327644 / Confirmed  Neurocognitive disorder, unspecified / SNOMED CT 1263774807 / Confirmed  Alcohol abuse / SNOMED CT 69356653 / Confirmed  Obesity / ICD-9-.00 / Probable  Moderate depressive disorder / SNOMED CT 798585186 / Confirmed  Disc degeneration, lumbar / SNOMED CT 82673223 / Confirmed  Anemia of chronic disease / SNOMED CT 372Q0MT6-[id number] / Confirmed  TBI (traumatic brain injury) / SNOMED CT 775990 / Confirmed  Hypertension / SNOMED CT 68673089 / Confirmed  Anxiety / SNOMED CT 11643265 / Confirmed  Smoker / SNOMED CT [id number] / Confirmed  Resolved: *Hospitalized@[facility] - Alcohol withdrawal, acute diverticulitis  Resolved: *Hospitalized@[facility] - Acute kidney injury, severe  Resolved: *Hospitalized@[facility] TBI  Resolved: *Hospitalized@[facility] - Alcohol withdrawal, Recent TBI      Histories   Past Medical History:    Active  Neurocognitive disorder, unspecified (0315913465): Onset on 9/17/2015 at 49 years.  Smoker ([id number])  Moderate depressive disorder (500209520)  Hypertension (44314511)  Alcohol abuse  (90551645)  Polyneuropathy (35129672)  Anemia of chronic disease ([id number])  Resolved  *Hospitalized@[facility] - Alcohol withdrawal, Recent TBI: Onset on 4/19/2015 at 49 years.  Resolved on 4/21/2015 at 49 years.  *Hospitalized@[facility] - TBI: Onset on 4/11/2015 at 49 years.  Resolved on 4/14/2015 at 49 years.  *Hospitalized@[facility] - Acute kidney injury, severe: Onset on 3/30/2015 at 49 years.  Resolved on 4/6/2015 at 49 years.  *Hospitalized@[facility] - Alcohol withdrawal, acute diverticulitis: Onset on 3/19/2015 at 49 years.  Resolved on 3/26/2015 at 49 years.   Family History:    Diabetes mellitus - adult onset  Grandfather (P)  CAD - Coronary artery disease  Father     Procedure history:    Upper GI endoscopy (SNOMED CT 1831619678) on 6/20/2016 at 50 Years.  Comments:  6/23/2016 1:26 PM - Nalini Echevarria CMA  Medium-sized hiatus hernia  LA Grade A reflux esophagitis  No gross lesions in the stomach.  Colonoscopy (SNOMED CT 055052939) performed by Arturo Mcghee on 5/1/2015 at 49 Years.  Comments:  5/11/2015 4:05 PM - Ludy Renee RN  Sedation: MAC  Indication: lower abdominal pain, hematochezia  7mm sessile serrated adenoma, 8mm sessile serrated adenoma, 18mm tubular adenoma consistent with advanced adenoma due to size, hyperplastic polyp  Internal hemorrhoids.  Repeat in 3 years.  Cyst removal on chest.   Social History:        Alcohol Assessment            Current, 3-5 times per week                     Comments:                      02/03/2015 - Tamir Kennedy CMA                     3+ drinks per time      Tobacco Assessment            Current, Cigarettes, 10 per day.  30 year(s).      Substance Abuse Assessment            Never      Exercise and Physical Activity Assessment: Does not exercise      Sexual Assessment            Sexually active: No.  Sexual orientation: Homosexual.      Other Assessment                     Comments:                       01/30/2015 - Hodan TOLEDO, Cece                     Unemployed.    Single.    No children.   + smoker.        Physical Examination   Vital Signs   1/24/2017 12:09 PM CST BP Systolic Repeat 130 mmHg    BP Diastolic Repeat 92 mmHg   1/24/2017 11:28 AM CST Temperature Tympanic 98.5 DegF    Peripheral Pulse Rate 88 bpm    Pulse Site Radial artery    HR Method Manual    Systolic Blood Pressure 130 mmHg    Diastolic Blood Pressure 90 mmHg    Mean Arterial Pressure 103 mmHg    BP Site Right arm    BP Method Manual      Measurements from flowsheet : Measurements   1/24/2017 11:28 AM CST Height Measured - Standard 70 in    Weight Measured - Standard 294.8 lb    BSA 2.57 m2    Body Mass Index 42.29 kg/m2      General:  Alert and oriented, No acute distress.    Eye:  Normal conjunctiva.    HENT:  Tympanic membranes are clear, Normal hearing, Oral mucosa is moist, No pharyngeal erythema, No sinus tenderness.    Neck:  Supple, Non-tender, No lymphadenopathy.    Respiratory:  Lungs are clear to auscultation, Respirations are non-labored, Breath sounds are equal, Symmetrical chest wall expansion.    Cardiovascular:  Normal rate, Regular rhythm, No murmur.    Musculoskeletal:  Normal range of motion, Normal gait.    Integumentary:  Warm, Dry, Pink, No rash.    Neurologic:  Alert, Oriented.    Psychiatric:  Cooperative.       Impression and Plan   Diagnosis     Hypertension (WBU60-LD I10).     Obesity (QID65-AC E66.9).     DM (diabetes mellitus screen) (ZWM88-QN Z13.1).     Polyneuropathy (UVM88-MR G62.1).     Foraminal stenosis of cervical region (XTE28-SA M99.81).     Alcohol abuse (DOV77-LM F10.10).     Obesity (CGQ47-WU E66.9).     Disc degeneration, lumbar (RTL55-LU M51.36).     Smoker (BEW05-QG Z72.0).     Patient Instructions:       Counseled: Patient, Regarding diagnosis, Regarding treatment, Regarding medications, Verbalized understanding, 1.  Obesity--WIll see Migdalia Triplett RD for weight loss. May need to consider wt loss,  med, possibly Contrave as he could use help with smoking addcition, depression as well as obesity.  2.  HTN--todays reading is higher, will rtc for BP re check, may need to add HCTZ (states hx of swollen ankles) if does not come down.  3.  Alcoholism--discouraged drinking ETOH, encouraged more jphysical activity and productive activity. He certainly has disabilities preventing alot of physical activity but he functions very well.  Encouraged him to be productive.  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  . I did review his previous chart notes and radiology reports, specifically MRI of brain 8/15 to 3/16, showing . No recommendations for further imaging made. he has no worsening symptoms. WIll hold off on further imaging at this time  5. Does have hx of EGD showing haital hernia and esophogitis--controlled at this time.    Orders     Orders (Selected)   Outpatient Orders  Ordered  Return to Clinic (Request): RFV: CSS BP check, Return in 2 weeks  Completed  Basic Metabolic Panel (Request): Hypertension  Hemoglobin A1c (Request): DM (diabetes mellitus screen)  Lipid Panel and Chol/HDL Ratio w/ Reflex to Direct LDL (Request): Obesity  Prescriptions  Prescribed  FLUoxetine 40 mg oral capsule: 1 cap(s) ( 40 mg ), PO, Daily, # 90 cap(s), 1 Refill(s), Type: Maintenance, Pharmacy: [facility], 1 cap(s) po daily  Vistaril 50 mg oral capsule: 1 cap(s) ( 50 mg ), PO, QID, PRN: for anxiety, # 360 cap(s), 1 Refill(s), Type: Maintenance, Pharmacy: [facility], 1 cap(s) po qid,PRN:for anxiety  folic acid 1 mg oral tablet: 1 tab(s) ( 1 mg ), po, daily, x 90 day(s), # 90 tab(s), 1 Refill(s), Type: Physician Stop, Pharmacy: [facility], 1 tab(s) po daily,x90 day(s)  gabapentin 400 mg oral capsule: 1 cap(s) ( 400 mg ), po, qid, # 360 cap(s), 1 Refill(s), Type: Maintenance, Pharmacy: [facility], 1 cap(s) po qid  lisinopril 10 mg oral tablet: 1 tab(s) ( 10 mg ), po, daily, # 90 tab(s), 1 Refill(s), Type: Maintenance,  Pharmacy: [facility], 1 tab(s) po daily.         addendum--will ask care coordination to consult, he would benefit from support and resources.

## 2022-02-16 NOTE — NURSING NOTE
Phone Message    PCP:  BALDOMERO     Time of Call:  1:03       Person Calling:  Torin  Phone number:  [phone]    Time returned call:  1:31 LMTCB    Note:  Patient called asking if he had an appointment scheduled with [facility].  I reviewed the chart and found none.  Patient is also due for colonoscopy in May and inquired how to schedule.  I advised making appointment with [facility] and she could start the process.      Transferred to: _

## 2022-02-16 NOTE — NURSING NOTE
Comprehensive Intake Entered On:  12/10/2019 11:12 AM CST    Performed On:  12/10/2019 11:11 AM CST by Fuad Sunshine               Summary   Chief Complaint :   Pt here today for hospital f/up.    Advance Directive :   No   Weight Measured :   269.4 lb(Converted to: 269 lb 6 oz, 122.20 kg)    Height Measured :   70 in(Converted to: 5 ft 10 in, 177.80 cm)    Body Mass Index :   38.65 kg/m2 (HI)    Body Surface Area :   2.45 m2   Systolic Blood Pressure :   112 mmHg   Diastolic Blood Pressure :   76 mmHg   Mean Arterial Pressure :   88 mmHg   Peripheral Pulse Rate :   87 bpm   BP Site :   Right arm   BP Method :   Manual   HR Method :   Manual   Oxygen Saturation :   97 %   Race :      Languages :   English   Ethnicity :   Not  or    Fuad Sunshine - 12/10/2019 11:11 AM CST   Health Status   Allergies Verified? :   Yes   Medication History Verified? :   Yes   Medical History Verified? :   Yes   Pre-Visit Planning Status :   Completed   Fuad Sunshine - 12/10/2019 11:11 AM CST   Consents   Consent for Immunization Exchange :   Consent Granted   Consent for Immunizations to Providers :   Consent Granted   Fuad Sunshine - 12/10/2019 11:11 AM CST

## 2022-02-16 NOTE — TELEPHONE ENCOUNTER
Entered by Kiara Wood CMA on August 18, 2020 1:11:19 PM CDT  ---------------------  From: Kiara Wood CMA   To: [facility]    Sent: 8/18/2020 1:11:15 PM CDT  Subject: Medication Management     ** Not Approved: Patient no longer under Prescriber care, per 6/24 note **  folic acid (FOLIC ACID  TAB 1MG - WW TABLET)  TAKE ONE TABLET BY MOUTH ONCE DAILY  Qty:  90 unknown unit        Days Supply:  0        Refills:  0          Substitutions Allowed     Route To Pharmacy - [facility]   Signed by Kiara Wood CMA            ** Patient matched by Kiara Wood CMA on 8/18/2020 1:10:10 PM CDT **      ------------------------------------------  From: [facility]  To: Marii Bell  Sent: August 18, 2020 12:10:34 PM CDT  Subject: Medication Management  Due: August 12, 2020 4:14:54 PM CDT     ** On Hold Pending Signature **     Dispensed Drug: folic acid (folic acid 1 mg oral tablet), TAKE ONE TABLET BY MOUTH ONCE DAILY  Quantity: 90 unknown unit  Days Supply: 0  Refills: 0  Substitutions Allowed  Notes from Pharmacy:  ------------------------------------------

## 2022-02-16 NOTE — TELEPHONE ENCOUNTER
Entered by Akiko Lopez MA on March 02, 2021 10:27:56 AM CST  ---------------------  From: Akiko Lopez MA   To: [facility]    Sent: 3/2/2021 10:27:55 AM CST  Subject: Medication Management     ** Not Approved: Prescriber not associated with location **  multivitamin (B-COMPLEX W/C  TABLET)  TAKE 1 TABLET BY MOUTH ONCE DAILY  Qty:  30 EA        Days Supply:  30        Refills:  0          Substitutions Allowed     Route To Pharmacy - [facility]   Signed by Akiko Lopez MA            ** Patient matched by Akiko Lopez MA on 3/2/2021 10:27:40 AM CST **      ------------------------------------------  From: [facility]  To: Cece Pettit MD  Sent: March 2, 2021 10:22:08 AM CST  Subject: Medication Management  Due: February 19, 2021 9:56:32 PM CST     ** On Hold Pending Signature **     Drug: multivitamin (Vitamin B Complex with C oral tablet), TAKE 1 TABLET BY MOUTH ONCE DAILY  Quantity: 30 EA  Days Supply: 30  Refills: 0  Substitutions Allowed  Notes from Pharmacy:     Dispensed Drug: multivitamin (Vitamin B Complex with C oral tablet), TAKE 1 TABLET BY MOUTH ONCE DAILY  Quantity: 30 EA  Days Supply: 30  Refills: 0  Substitutions Allowed  Notes from Pharmacy:  ------------------------------------------

## 2022-02-16 NOTE — TELEPHONE ENCOUNTER
Entered by Kenyetta Mg CMA on September 08, 2021 1:57:26 PM CDT  ---------------------  From: Kenyetta Mg CMA   To: [facility]    Sent: 9/8/2021 1:57:21 PM CDT  Subject: Medication Management     ** Not Approved: Patient no longer under Prescriber care **  sucralfate (SUCRALFATE 1GM TABLET)  TAKE 1 TABLET BY MOUTH 3 TIMES A DAY BEFORE MEALS  Qty:  90 tab(s)        Days Supply:  30        Refills:  0          Substitutions Allowed     Route To Pharmacy - [facility]   Signed by Kenyetta Mg CMA            ** Patient matched by Kenyetta Mg CMA on 9/8/2021 1:56:37 PM CDT **      ------------------------------------------  From: [facility]  To: Vinicio Mcleod MD  Sent: September 7, 2021 11:19:48 AM CDT  Subject: Medication Management  Due: August 20, 2021 11:17:32 AM CDT     ** On Hold Pending Signature **     Drug: sucralfate (sucralfate 1 g oral tablet), TAKE 1 TABLET BY MOUTH 3 TIMES A DAY BEFORE MEALS  Quantity: 90 tab(s)  Days Supply: 30  Refills: 0  Substitutions Allowed  Notes from Pharmacy:     Dispensed Drug: sucralfate (sucralfate 1 g oral tablet), TAKE 1 TABLET BY MOUTH 3 TIMES A DAY BEFORE MEALS  Quantity: 90 tab(s)  Days Supply: 30  Refills: 0  Substitutions Allowed  Notes from Pharmacy:  ------------------------------------------

## 2022-02-16 NOTE — TELEPHONE ENCOUNTER
Entered by Viri Shine RN on July 01, 2019 5:14:33 PM CDT  ---------------------  From: Viri Shine RN   To: [facility]    Sent: 7/1/2019 5:14:33 PM CDT  Subject: Medication Management     ** Not Approved: Pt needs visit with provider. **  furosemide (FUROSEMIDE   TAB 40MG TABLET)  TAKE 1 TABLET BY MOUTH TWICE DAILY FOR 30 DAYS  Qty:  60 unknown unit        Days Supply:  0        Refills:  0          Substitutions Allowed     Route To Pharmacy - [facility]   Signed by Viri Shine RN            ** Patient matched by Viri Shine RN on 7/1/2019 5:13:06 PM CDT **      ------------------------------------------  From: [facility]  To: Torres Stewart MD  Sent: June 28, 2019 1:42:08 PM CDT  Subject: Medication Management  Due: June 29, 2019 1:42:08 PM CDT    ** On Hold Pending Signature **  Drug: furosemide (Lasix 40 mg oral tablet)  TAKE 1 TABLET BY MOUTH TWICE DAILY FOR 30 DAYS  Quantity: 60 unknown unit  Days Supply: 0         Refills: 0  Substitutions Allowed  Notes from Pharmacy:     Dispensed Drug: furosemide (furosemide 40 mg oral tablet)  TAKE 1 TABLET BY MOUTH TWICE DAILY FOR 30 DAYS  Quantity: 60 unknown unit  Days Supply: 0         Refills: 0  Substitutions Allowed  Notes from Pharmacy:   ------------------------------------------Medication not on patient's active medication list. Has not been seen in clinic since 2017.

## 2022-02-16 NOTE — PROGRESS NOTES
Patient:   ROSA RAMIREZ JR            MRN: [id number]            FIN: [id number]               Age:   51 years     Sex:  Male     :  1965   Associated Diagnoses:   HTN (hypertension); Polyneuropathy; Alcohol abuse; Anxiety   Author:   Marii Bell      Report Summary   Diagnosis  HTN (hypertension) (ZXS04-EW I10).  Plan:       Diet: Sodium restricted. Patient Instructions:       Counseled: Patient, Regarding diagnosis, Regarding treatment, Regarding medications, Diet, Activity, Smoking cessation, Verbalized understanding, supported his efforts at smoking cessation  will recheck BMP and refill meds for HTN for 1 year  see me in 6 months for other chronic dz and ETOH reassessment  He assures me he drinks no molre than 2 beers per day. Orders  Orders (Selected)   Outpatient Orders  Ordered  Case Management Consult (Request): Referred to: care coordinators to contact, Hypertension  Polyneuropathy  Alcohol abuse  Prescriptions  Prescribed  FLUoxetine 60 mg oral tablet: 1 tab(s) ( 60 mg ), po, qam, Instructions: this is a dose change, # 90 tab(s), 1 Refill(s), Type: Maintenance, Pharmacy: [facility], 1 tab(s) po qam,Instr:this is a dose change  gabapentin 400 mg oral capsule: 1 cap(s) ( 400 mg ), po, qid, # 360 cap(s), 1 Refill(s), Type: Maintenance, Pharmacy: [facility], ., 1 cap(s) po qid  hydrOXYzine pamoate 50 mg oral capsule: 1 cap(s) ( 50 mg ), po, qid, PRN: as needed for anxiety, # 120 cap(s), 3 Refill(s), Type: Maintenance, Pharmacy: [facility], 1 cap(s) po qid,PRN:as needed for anxiety  lisinopril 10 mg oral tablet: 1 tab(s) ( 10 mg ), po, daily, # 90 tab(s), 4 Refill(s), Type: Maintenance, Pharmacy: [facility], 1 tab(s) po daily  nicotine 14 mg/24 hr transdermal film, extended release: 1 patch(es), TOP, Daily, Instructions: he can call in 2 weeks and let me know how this is going then can taper--MUST NOT SMOKE With patch on, # 14 patch(es), 0 Refill(s), Type: Maintenance, Pharmacy:  [facility], 1 patch(es) top daily,x14 day(s),Inst...  pantoprazole 40 mg oral delayed release tablet: 1 tab(s) ( 40 mg ), po, daily, # 90 tab(s), 1 Refill(s), Type: Maintenance, Pharmacy: [facility], 1 tab(s) po daily.     Visit Information   Visit type:  Med check for hypertension.    Accompanied by:  No one.    Source of history:  Self.       Chief Complaint   10/25/2017 11:21 AM CDT  Rx refill and wants to make sure he is up to date on all his vaccines.        History of Present Illness   Concerning symptoms as listed in Chief Complaint above discussed and confirmed with patient  He needs some meds refills  1. Feeling more anxious now that he quit smoking, very proud of himself just more shakey. Would like to increase fluoxetine. Still using vistaril 2-4 times per day  2.  He has HTN, well controlled, needs refill Working to loose weight  3.  Has used two PPIs simultaneously in the past, will cut down to 1 daily with goal of fully getting off the ppis if able  4.  Wants to remain smoke free, will have rx for patch to use prn if needed sparingly         Review of Systems   Constitutional:  No weakness, No fatigue.    Eye:  No visual disturbances.    Respiratory:  No shortness of breath, No cough.    Cardiovascular:  No chest pain, No palpitations, No peripheral edema, No syncope.    Gastrointestinal:  No nausea.    Neurologic:  Alert and oriented X4, No numbness, No tingling, No headache.       Health Status   Allergies:    Allergic Reactions (Selected)  No known allergies   Medications:  (Selected)   Prescriptions  Prescribed  FLUoxetine 60 mg oral tablet: 1 tab(s) ( 60 mg ), po, qam, Instructions: this is a dose change, # 90 tab(s), 1 Refill(s), Type: Maintenance, Pharmacy: [facility], 1 tab(s) po qam,Instr:this is a dose change  MAGNESIUM OXIDE 500 MG TABLET: See Instructions, Instructions: TAKE ONE TABLET BY MOUTH AT BEDTIME, # 30 unknown unit, 2 Refill(s), Pharmacy: [facility], TAKE ONE TABLET BY  MOUTH AT BEDTIME  folic acid 1 mg oral tablet: 1 tab(s) ( 1 mg ), po, daily, # 30 tab(s), 0 Refill(s), Type: Maintenance, Pharmacy: [facility], pt needs appt for HTN f/u per [facility] soon for further refills., 1 tab(s) po daily  gabapentin 400 mg oral capsule: 1 cap(s) ( 400 mg ), po, qid, # 360 cap(s), 1 Refill(s), Type: Maintenance, Pharmacy: [facility], ., 1 cap(s) po qid  hydrOXYzine pamoate 50 mg oral capsule: 1 cap(s) ( 50 mg ), po, qid, PRN: as needed for anxiety, # 120 cap(s), 3 Refill(s), Type: Maintenance, Pharmacy: [facility], 1 cap(s) po qid,PRN:as needed for anxiety  lisinopril 10 mg oral tablet: 1 tab(s) ( 10 mg ), po, daily, # 90 tab(s), 4 Refill(s), Type: Maintenance, Pharmacy: [facility], 1 tab(s) po daily  nicotine 14 mg/24 hr transdermal film, extended release: 1 patch(es), TOP, Daily, Instructions: he can call in 2 weeks and let me know how this is going then can taper--MUST NOT SMOKE With patch on, # 14 patch(es), 0 Refill(s), Type: Maintenance, Pharmacy: [facility], 1 patch(es) top daily,x14 day(s),Inst...  pantoprazole 40 mg oral delayed release tablet: 1 tab(s) ( 40 mg ), po, daily, # 90 tab(s), 1 Refill(s), Type: Maintenance, Pharmacy: [facility], 1 tab(s) po daily  Documented Medications  Documented  Vitamin B12 1000 mcg oral tablet: 1 tab(s) ( 1,000 mcg ), po, hs, 0 Refill(s), Type: Maintenance  Vitamin B1: ( 250 mg ), po, daily, 0 Refill(s), Type: Maintenance  Vitamin C: 0 Refill(s), Type: Maintenance  polyethylene glycol 3350 with electrolytes oral powder for reconstitution: 17g, po, daily, 0 Refill(s), Type: Maintenance  vitamin E: po, daily, 0 Refill(s), Type: Maintenance   Problem list:    All Problems  Foraminal stenosis of cervical region / SNOMED CT 552002258 / Confirmed  Polyneuropathy / SNOMED CT 22538938 / Confirmed  Neurocognitive disorder, unspecified / SNOMED CT 0873940222 / Confirmed  Alcohol abuse / SNOMED CT 68740466 / Confirmed  Obesity / ICD-9-.00 /  Probable  Moderate depressive disorder / SNOMED CT 082464905 / Confirmed  Disc degeneration, lumbar / SNOMED CT 38348442 / Confirmed  Anemia of chronic disease / SNOMED CT [id number] / Confirmed  TBI (traumatic brain injury) / SNOMED CT 763280 / Confirmed  Hypertension / SNOMED CT 53718070 / Confirmed  Anxiety / SNOMED CT 40307895 / Confirmed  Resolved: *Hospitalized@[facility] - Alcohol withdrawal, acute diverticulitis  Resolved: *Hospitalized@[facility] - Acute kidney injury, severe  Resolved: *Hospitalized@[facility] TBI  Resolved: *Hospitalized@[facility] Alcohol withdrawal, Recent TBI  Resolved: Smoker / SNOMED CT [id number]      Histories   Past Medical History:    Active  Neurocognitive disorder, unspecified (7180354565): Onset on 9/17/2015 at 49 years.  Moderate depressive disorder (930331585)  Hypertension (32879514)  Alcohol abuse (31646762)  Polyneuropathy (50497773)  Anemia of chronic disease ([id number])  Resolved  *Hospitalized@[facility] - Alcohol withdrawal, Recent TBI: Onset on 4/19/2015 at 49 years.  Resolved on 4/21/2015 at 49 years.  *Hospitalized@[facility] TBI: Onset on 4/11/2015 at 49 years.  Resolved on 4/14/2015 at 49 years.  *Hospitalized@[facility] - Acute kidney injury, severe: Onset on 3/30/2015 at 49 years.  Resolved on 4/6/2015 at 49 years.  *Hospitalized@[facility] - Alcohol withdrawal, acute diverticulitis: Onset on 3/19/2015 at 49 years.  Resolved on 3/26/2015 at 49 years.  Smoker ([id number]):  Resolved.   Family History:    Diabetes mellitus - adult onset  Grandfather (P)  CAD - Coronary artery disease  Father     Procedure history:    Upper GI endoscopy (SNOMED CT 3797524051) on 6/20/2016 at 50 Years.  Comments:  6/23/2016 1:26 PM - [place] CMA, Nalini  Medium-sized hiatus hernia  LA Grade A reflux esophagitis  No gross lesions in the stomach.  [facility] performed by Arturo Mcghee on 5/1/2015 at 49 Years.  Comments:  5/11/2015 4:05 PM - Ludy Renee RN  Sedation: MAC  Indication: lower abdominal pain, hematochezia  7mm sessile serrated adenoma, 8mm sessile serrated adenoma, 18mm tubular adenoma consistent with advanced adenoma due to size, hyperplastic polyp  Internal hemorrhoids.  Repeat in 3 years.  Cyst removal on chest.   Social History:        Alcohol Assessment            Current, 3-5 times per week                     Comments:                      02/03/2015 - Brent CASTAÑEDA, Tamir                     3+ drinks per time      Tobacco Assessment            Current, Cigarettes, 10 per day.  30 year(s).      Substance Abuse Assessment            Never      Exercise and Physical Activity Assessment: Does not exercise      Sexual Assessment            Sexually active: No.  Sexual orientation: Homosexual.      Other Assessment                     Comments:                      01/30/2015 - Cece Pettit MD                     Unemployed.    Single.    No children.   + smoker.        Physical Examination   Vital Signs   10/25/2017 11:21 AM CDT Temperature Tympanic 98.2 DegF    Peripheral Pulse Rate 100 bpm    Pulse Site Radial artery    HR Method Manual    Systolic Blood Pressure 128 mmHg    Diastolic Blood Pressure 84 mmHg    Mean Arterial Pressure 99 mmHg    BP Site Right arm    BP Method Manual      Measurements from flowsheet : Measurements   10/25/2017 11:21 AM CDT Height Measured - Standard 70 in    Weight Measured - Standard 291.0 lb    BSA 2.55 m2    Body Mass Index 41.75 kg/m2      General:  Alert and oriented, No acute distress.    Eye:  Pupils are equal, round and reactive to light, Extraocular movements are intact, Normal conjunctiva.    HENT:  Tympanic membranes are clear, Oral mucosa is moist, No pharyngeal erythema.    Neck:  Supple, Non-tender, No carotid bruit, No jugular venous distention, No lymphadenopathy, No  thyromegaly.    Respiratory:  Lungs are clear to auscultation, Respirations are non-labored, Breath sounds are equal, Symmetrical chest wall expansion.    Cardiovascular:  Normal rate, Regular rhythm, No murmur, Normal peripheral perfusion, No edema.    Gastrointestinal:  Soft, Non-tender, No organomegaly.    Integumentary:  Warm, Dry, Pink, No rash.    Neurologic:  Alert, Oriented.       Review / Management   Results review      Impression and Plan   Diagnosis     HTN (hypertension) (XWO03-YS I10).     HTN (hypertension) (TJP20-DQ I10).     Polyneuropathy (TEF05-BI G62.1).     Alcohol abuse (OWK93-TP F10.10).     Anxiety (ZIL10-RE F41.9).     Plan:       Diet: Sodium restricted.    Patient Instructions:       Counseled: Patient, Regarding diagnosis, Regarding treatment, Regarding medications, Diet, Activity, Smoking cessation, Verbalized understanding, supported his efforts at smoking cessation  will recheck BMP and refill meds for HTN for 1 year  see me in 6 months for other chronic dz and ETOH reassessment  He assures me he drinks no molre than 2 beers per day.    Orders     Orders (Selected)   Outpatient Orders  Ordered  Case Management Consult (Request): Referred to: care coordinators to contact, Hypertension  Polyneuropathy  Alcohol abuse  Prescriptions  Prescribed  FLUoxetine 60 mg oral tablet: 1 tab(s) ( 60 mg ), po, qam, Instructions: this is a dose change, # 90 tab(s), 1 Refill(s), Type: Maintenance, Pharmacy: [facility], 1 tab(s) po qam,Instr:this is a dose change  gabapentin 400 mg oral capsule: 1 cap(s) ( 400 mg ), po, qid, # 360 cap(s), 1 Refill(s), Type: Maintenance, Pharmacy: [facility], ., 1 cap(s) po qid  hydrOXYzine pamoate 50 mg oral capsule: 1 cap(s) ( 50 mg ), po, qid, PRN: as needed for anxiety, # 120 cap(s), 3 Refill(s), Type: Maintenance, Pharmacy: [facility], 1 cap(s) po qid,PRN:as needed for anxiety  lisinopril 10 mg oral tablet: 1 tab(s) ( 10 mg ), po, daily, # 90 tab(s), 4  Refill(s), Type: Maintenance, Pharmacy: [facility], 1 tab(s) po daily  nicotine 14 mg/24 hr transdermal film, extended release: 1 patch(es), TOP, Daily, Instructions: he can call in 2 weeks and let me know how this is going then can taper--MUST NOT SMOKE With patch on, # 14 patch(es), 0 Refill(s), Type: Maintenance, Pharmacy: [facility], 1 patch(es) top daily,x14 day(s),Inst...  pantoprazole 40 mg oral delayed release tablet: 1 tab(s) ( 40 mg ), po, daily, # 90 tab(s), 1 Refill(s), Type: Maintenance, Pharmacy: [facility], 1 tab(s) po daily.

## 2022-02-16 NOTE — TELEPHONE ENCOUNTER
---------------------  From: Patricia Ann (Phone Messages Pool ([id number]_[facility]))   To: Vinicio Mcleod MD;     Sent: 11/7/2019 9:49:20 AM CST  Subject: Result: Positive MRSA     FYI    Phone Message    PCP:   None       Time of Call:  9:30am       Person Calling:  [facility]  Phone number:  _    Note:   Hospital lab calling to make sure [facility] is aware of the positive MRSA result from a swab taken while patient was inpatient on 11/5/19.     It appears that patient was discharged by [facility] on 11/6/19, but is seen by [facility] at [facility].

## 2022-08-15 ENCOUNTER — APPOINTMENT (OUTPATIENT)
Dept: GENERAL RADIOLOGY | Facility: CLINIC | Age: 57
End: 2022-08-15
Attending: PHYSICIAN ASSISTANT
Payer: MEDICARE

## 2022-08-15 ENCOUNTER — HOSPITAL ENCOUNTER (OUTPATIENT)
Facility: CLINIC | Age: 57
Setting detail: OBSERVATION
Discharge: HOME | End: 2022-08-16
Attending: INTERNAL MEDICINE | Admitting: INTERNAL MEDICINE
Payer: MEDICARE

## 2022-08-15 PROBLEM — N17.9 AKI (ACUTE KIDNEY INJURY) (H): Status: ACTIVE | Noted: 2022-08-15

## 2022-08-15 LAB
ALBUMIN SERPL-MCNC: 3.8 G/DL (ref 3.4–5)
ALP SERPL-CCNC: 187 U/L (ref 40–150)
ALT SERPL W P-5'-P-CCNC: 47 U/L (ref 0–70)
ANION GAP SERPL CALCULATED.3IONS-SCNC: 9 MMOL/L (ref 3–14)
AST SERPL W P-5'-P-CCNC: 34 U/L (ref 0–45)
BILIRUB SERPL-MCNC: 0.9 MG/DL (ref 0.2–1.3)
BUN SERPL-MCNC: 15 MG/DL (ref 7–30)
CALCIUM SERPL-MCNC: 8.8 MG/DL (ref 8.5–10.1)
CHLORIDE BLD-SCNC: 107 MMOL/L (ref 94–109)
CO2 SERPL-SCNC: 25 MMOL/L (ref 20–32)
CREAT SERPL-MCNC: 1.78 MG/DL (ref 0.66–1.25)
ERYTHROCYTE [DISTWIDTH] IN BLOOD BY AUTOMATED COUNT: 14.9 % (ref 10–15)
GFR SERPL CREATININE-BSD FRML MDRD: 44 ML/MIN/1.73M2
GLUCOSE BLD-MCNC: 132 MG/DL (ref 70–99)
HCT VFR BLD AUTO: 41.8 % (ref 40–53)
HGB BLD-MCNC: 14.4 G/DL (ref 13.3–17.7)
MAGNESIUM SERPL-MCNC: 1.9 MG/DL (ref 1.6–2.3)
MCH RBC QN AUTO: 30.9 PG (ref 26.5–33)
MCHC RBC AUTO-ENTMCNC: 34.4 G/DL (ref 31.5–36.5)
MCV RBC AUTO: 90 FL (ref 78–100)
PHOSPHATE SERPL-MCNC: 4.1 MG/DL (ref 2.5–4.5)
PLATELET # BLD AUTO: 170 10E3/UL (ref 150–450)
POTASSIUM BLD-SCNC: 3.3 MMOL/L (ref 3.4–5.3)
PROT SERPL-MCNC: 7.4 G/DL (ref 6.8–8.8)
RBC # BLD AUTO: 4.66 10E6/UL (ref 4.4–5.9)
SODIUM SERPL-SCNC: 141 MMOL/L (ref 133–144)
WBC # BLD AUTO: 8.4 10E3/UL (ref 4–11)

## 2022-08-15 PROCEDURE — 96361 HYDRATE IV INFUSION ADD-ON: CPT

## 2022-08-15 PROCEDURE — G0378 HOSPITAL OBSERVATION PER HR: HCPCS

## 2022-08-15 PROCEDURE — 99220 PR INITIAL OBSERVATION CARE,LEVEL III: CPT | Performed by: PHYSICIAN ASSISTANT

## 2022-08-15 PROCEDURE — 83735 ASSAY OF MAGNESIUM: CPT | Performed by: PHYSICIAN ASSISTANT

## 2022-08-15 PROCEDURE — 96360 HYDRATION IV INFUSION INIT: CPT

## 2022-08-15 PROCEDURE — 84100 ASSAY OF PHOSPHORUS: CPT | Performed by: PHYSICIAN ASSISTANT

## 2022-08-15 PROCEDURE — 36415 COLL VENOUS BLD VENIPUNCTURE: CPT | Performed by: PHYSICIAN ASSISTANT

## 2022-08-15 PROCEDURE — 258N000003 HC RX IP 258 OP 636: Performed by: PHYSICIAN ASSISTANT

## 2022-08-15 PROCEDURE — G0378 HOSPITAL OBSERVATION PER HR: HCPCS | Performed by: PHYSICAL THERAPIST

## 2022-08-15 PROCEDURE — 250N000013 HC RX MED GY IP 250 OP 250 PS 637: Performed by: PHYSICIAN ASSISTANT

## 2022-08-15 PROCEDURE — 80053 COMPREHEN METABOLIC PANEL: CPT | Performed by: PHYSICIAN ASSISTANT

## 2022-08-15 PROCEDURE — G0379 DIRECT REFER HOSPITAL OBSERV: HCPCS | Mod: 25

## 2022-08-15 PROCEDURE — 999N000065 XR CHEST PORT 1 VIEW

## 2022-08-15 PROCEDURE — 85027 COMPLETE CBC AUTOMATED: CPT | Performed by: PHYSICIAN ASSISTANT

## 2022-08-15 RX ORDER — AMOXICILLIN 250 MG
2 CAPSULE ORAL 2 TIMES DAILY PRN
Status: DISCONTINUED | OUTPATIENT
Start: 2022-08-15 | End: 2022-08-16 | Stop reason: HOSPADM

## 2022-08-15 RX ORDER — HYDROXYZINE PAMOATE 25 MG/1
50 CAPSULE ORAL 4 TIMES DAILY
Status: DISCONTINUED | OUTPATIENT
Start: 2022-08-15 | End: 2022-08-16 | Stop reason: HOSPADM

## 2022-08-15 RX ORDER — FOLIC ACID 1 MG/1
1 TABLET ORAL DAILY
Status: DISCONTINUED | OUTPATIENT
Start: 2022-08-15 | End: 2022-08-16 | Stop reason: HOSPADM

## 2022-08-15 RX ORDER — PROCHLORPERAZINE MALEATE 10 MG
10 TABLET ORAL EVERY 6 HOURS PRN
Status: DISCONTINUED | OUTPATIENT
Start: 2022-08-15 | End: 2022-08-16 | Stop reason: HOSPADM

## 2022-08-15 RX ORDER — ONDANSETRON 2 MG/ML
4 INJECTION INTRAMUSCULAR; INTRAVENOUS EVERY 6 HOURS PRN
Status: DISCONTINUED | OUTPATIENT
Start: 2022-08-15 | End: 2022-08-16 | Stop reason: HOSPADM

## 2022-08-15 RX ORDER — HALOPERIDOL 5 MG/ML
2.5-5 INJECTION INTRAMUSCULAR EVERY 6 HOURS PRN
Status: DISCONTINUED | OUTPATIENT
Start: 2022-08-15 | End: 2022-08-16 | Stop reason: HOSPADM

## 2022-08-15 RX ORDER — POLYETHYLENE GLYCOL 3350 17 G/17G
17 POWDER, FOR SOLUTION ORAL DAILY PRN
Status: DISCONTINUED | OUTPATIENT
Start: 2022-08-15 | End: 2022-08-16 | Stop reason: HOSPADM

## 2022-08-15 RX ORDER — POTASSIUM CHLORIDE 1500 MG/1
40 TABLET, EXTENDED RELEASE ORAL ONCE
Status: COMPLETED | OUTPATIENT
Start: 2022-08-15 | End: 2022-08-15

## 2022-08-15 RX ORDER — ACETAMINOPHEN 650 MG/1
650 SUPPOSITORY RECTAL EVERY 6 HOURS PRN
Status: DISCONTINUED | OUTPATIENT
Start: 2022-08-15 | End: 2022-08-16 | Stop reason: HOSPADM

## 2022-08-15 RX ORDER — ACETAMINOPHEN 325 MG/1
650 TABLET ORAL EVERY 6 HOURS PRN
Status: DISCONTINUED | OUTPATIENT
Start: 2022-08-15 | End: 2022-08-16 | Stop reason: HOSPADM

## 2022-08-15 RX ORDER — PROCHLORPERAZINE 25 MG
25 SUPPOSITORY, RECTAL RECTAL EVERY 12 HOURS PRN
Status: DISCONTINUED | OUTPATIENT
Start: 2022-08-15 | End: 2022-08-16 | Stop reason: HOSPADM

## 2022-08-15 RX ORDER — OLANZAPINE 5 MG/1
5-10 TABLET, ORALLY DISINTEGRATING ORAL EVERY 6 HOURS PRN
Status: DISCONTINUED | OUTPATIENT
Start: 2022-08-15 | End: 2022-08-16 | Stop reason: HOSPADM

## 2022-08-15 RX ORDER — PANTOPRAZOLE SODIUM 40 MG/1
40 TABLET, DELAYED RELEASE ORAL DAILY
Status: DISCONTINUED | OUTPATIENT
Start: 2022-08-15 | End: 2022-08-16 | Stop reason: HOSPADM

## 2022-08-15 RX ORDER — AMOXICILLIN 250 MG
1 CAPSULE ORAL 2 TIMES DAILY PRN
Status: DISCONTINUED | OUTPATIENT
Start: 2022-08-15 | End: 2022-08-16 | Stop reason: HOSPADM

## 2022-08-15 RX ORDER — ONDANSETRON 4 MG/1
4 TABLET, ORALLY DISINTEGRATING ORAL EVERY 6 HOURS PRN
Status: DISCONTINUED | OUTPATIENT
Start: 2022-08-15 | End: 2022-08-16 | Stop reason: HOSPADM

## 2022-08-15 RX ORDER — MULTIPLE VITAMINS W/ MINERALS TAB 9MG-400MCG
1 TAB ORAL DAILY
Status: DISCONTINUED | OUTPATIENT
Start: 2022-08-15 | End: 2022-08-16 | Stop reason: HOSPADM

## 2022-08-15 RX ORDER — LIDOCAINE 40 MG/G
CREAM TOPICAL
Status: DISCONTINUED | OUTPATIENT
Start: 2022-08-15 | End: 2022-08-16 | Stop reason: HOSPADM

## 2022-08-15 RX ORDER — DIAZEPAM 5 MG
10 TABLET ORAL EVERY 30 MIN PRN
Status: DISCONTINUED | OUTPATIENT
Start: 2022-08-15 | End: 2022-08-16 | Stop reason: HOSPADM

## 2022-08-15 RX ORDER — DIAZEPAM 10 MG/2ML
5-10 INJECTION, SOLUTION INTRAMUSCULAR; INTRAVENOUS EVERY 30 MIN PRN
Status: DISCONTINUED | OUTPATIENT
Start: 2022-08-15 | End: 2022-08-16 | Stop reason: HOSPADM

## 2022-08-15 RX ORDER — FLUMAZENIL 0.1 MG/ML
0.2 INJECTION, SOLUTION INTRAVENOUS
Status: DISCONTINUED | OUTPATIENT
Start: 2022-08-15 | End: 2022-08-16 | Stop reason: HOSPADM

## 2022-08-15 RX ORDER — SODIUM CHLORIDE, SODIUM LACTATE, POTASSIUM CHLORIDE, CALCIUM CHLORIDE 600; 310; 30; 20 MG/100ML; MG/100ML; MG/100ML; MG/100ML
INJECTION, SOLUTION INTRAVENOUS CONTINUOUS
Status: DISCONTINUED | OUTPATIENT
Start: 2022-08-15 | End: 2022-08-15

## 2022-08-15 RX ADMIN — PANTOPRAZOLE SODIUM 40 MG: 40 TABLET, DELAYED RELEASE ORAL at 12:11

## 2022-08-15 RX ADMIN — GABAPENTIN 400 MG: 300 CAPSULE ORAL at 20:00

## 2022-08-15 RX ADMIN — FLUOXETINE 20 MG: 20 CAPSULE ORAL at 12:12

## 2022-08-15 RX ADMIN — THIAMINE HCL TAB 100 MG 100 MG: 100 TAB at 12:12

## 2022-08-15 RX ADMIN — FOLIC ACID 1 MG: 1 TABLET ORAL at 12:12

## 2022-08-15 RX ADMIN — ACETAMINOPHEN 650 MG: 325 TABLET ORAL at 12:37

## 2022-08-15 RX ADMIN — MELATONIN 5 MG TABLET 5 MG: at 22:18

## 2022-08-15 RX ADMIN — POTASSIUM CHLORIDE 40 MEQ: 1500 TABLET, EXTENDED RELEASE ORAL at 12:37

## 2022-08-15 RX ADMIN — HYDROXYZINE PAMOATE 50 MG: 25 CAPSULE ORAL at 18:33

## 2022-08-15 RX ADMIN — GABAPENTIN 400 MG: 300 CAPSULE ORAL at 12:12

## 2022-08-15 RX ADMIN — DEXTROSE AND SODIUM CHLORIDE: 5; 900 INJECTION, SOLUTION INTRAVENOUS at 22:18

## 2022-08-15 RX ADMIN — DEXTROSE AND SODIUM CHLORIDE: 5; 900 INJECTION, SOLUTION INTRAVENOUS at 12:37

## 2022-08-15 RX ADMIN — HYDROXYZINE PAMOATE 50 MG: 25 CAPSULE ORAL at 12:11

## 2022-08-15 RX ADMIN — GABAPENTIN 400 MG: 300 CAPSULE ORAL at 18:33

## 2022-08-15 RX ADMIN — HYDROXYZINE PAMOATE 50 MG: 25 CAPSULE ORAL at 20:00

## 2022-08-15 RX ADMIN — MULTIPLE VITAMINS W/ MINERALS TAB 1 TABLET: TAB at 12:12

## 2022-08-15 RX ADMIN — ACETAMINOPHEN 650 MG: 325 TABLET ORAL at 22:18

## 2022-08-15 ASSESSMENT — ACTIVITIES OF DAILY LIVING (ADL)
ADLS_ACUITY_SCORE: 33
ADLS_ACUITY_SCORE: 35
ADLS_ACUITY_SCORE: 33

## 2022-08-15 NOTE — PROGRESS NOTES
Observation goals  PRIOR TO DISCHARGE        Comments: -diagnostic tests and consults completed and resulted: not met  -vital signs normal or at patient baseline: partially met, HR tachy.  -tolerating oral intake to maintain hydration: partially met  -returns to baseline functional status: not met  -safe disposition plan has been identified: not met  Nurse to notify provider when observation goals have been met and patient is ready for discharge.

## 2022-08-15 NOTE — PHARMACY-ADMISSION MEDICATION HISTORY
Pharmacy Medication History  Admission medication history interview status for the 8/15/2022  admission is complete. See EPIC admission navigator for prior to admission medications     Location of Interview: Phone  Medication history sources: Patient    Significant changes made to the medication list:      In the past week, patient estimated taking medication this percent of the time: greater than 90%    Additional medication history information:       Medication reconciliation completed by provider prior to medication history? No    Time spent in this activity: 10min    Prior to Admission medications    Medication Sig Last Dose Taking? Auth Provider Long Term End Date   FLUOXETINE HCL PO Take 20 mg by mouth daily 8/14/2022 at Unknown time Yes Reported, Patient     FOLIC ACID PO Take 1 mg by mouth daily 8/14/2022 at Unknown time Yes Reported, Patient     GABAPENTIN PO Take 400 mg by mouth 4 times daily 8/14/2022 at Unknown time Yes Reported, Patient Yes    HydrOXYzine Pamoate (VISTARIL PO) Take 50 mg by mouth 4 times daily 8/14/2022 at Unknown time Yes Reported, Patient     LISINOPRIL PO Take 10 mg by mouth daily 8/14/2022 at Unknown time Yes Reported, Patient Yes    MAGNESIUM OXIDE PO Take 500 mg by mouth daily 8/14/2022 at Unknown time Yes Reported, Patient     PANTOPRAZOLE SODIUM PO Take 40 mg by mouth daily 8/14/2022 at Unknown time Yes Reported, Patient     Riboflavin (VITAMIN B-2 PO) Take 250 mg by mouth daily 8/14/2022 at Unknown time Yes Reported, Patient     VITAMIN E PO Take 400 Units by mouth daily 8/14/2022 at Unknown time Yes Reported, Patient         The information provided in this note is only as accurate as the sources available at the time of update(s)

## 2022-08-15 NOTE — PLAN OF CARE
Orientation/Cognitive: A&Ox4  Observation Goals (Met/ Not Met): not met  Mobility Level/Assist Equipment: SBA GB  Fall Risk (Y/N): yes  Behavior Concerns: none  Pain Management: HA managed with tylenol  Tele/VS/O2: tele: ST. VSS except for HR tachy. RA. Negative orthostatic BPs  ABNL Lab/BG: Cr 1.78, K 3.3 (one time replacement)  Diet: regular diet, tolerating  Bowel/Bladder: voiding, 3 loose BMs  Skin Concerns: scabs to bilateral big toes,  (L one open- polymem applied), Psoriasis to face  Drains/Devices: PIV  Tests/Procedures for next shift: none  Anticipated DC date & active delays: pending  Other: CIWA scores 5,4. Nausea, HA and tremors.

## 2022-08-15 NOTE — H&P
[facility]    History and Physical  Hospitalist       Date of Admission:  8/15/2022  Date of Service (when I saw the patient): 08/15/22    Assessment & Plan   Torin Colby Jr. is a 56 year old male with a past medical history of alcohol use disorder, alcoholic steatosis/cirrhosis, pancreatitis, CHF, anemia of chronic disease, polyneuropathy, kidney disease, and MRSA amongst other conditions who presented to [place] emergency department on 8/15/2022.    Patient was recently hospitalized for alcohol withdrawal 8/2-8/4 at an outside hospital.  It appears patient was discharged to [address] [place] [address], and was released 2 days ago.  While at home, patient reports he had not been eating and drinking much.  Felt lightheaded, did not have syncope or injury, though weakness with lightheadedness led him to call 911 and present to the ER.    He was found to have acute kidney injury and dehydration.  There were reported to be no beds through [facility], therefore patient placement was contacted for patient transfer here to [facility].  He has been registered for observation, volume resuscitation, and close monitoring.    Acute kidney injury  Hypokalemia  --Patient received 3 L IV fluids at outside hospital prior to arrival.  We will continue IV hydration with lactated Ringer's at 100 mL/h  --Repeat labs  -- Monitor renal function and electrolyte levels  -- Avoid nephrotoxins    Generalized weakness and dizziness  -- Check orthostatics  -- Hydrate and replace electrolytes  -- Patient ambulate with staff present, can consult PT if his weakness is significant impairing mobility    Abdominal pain, Nausea/vomiting  -- Ethanol level negative.  His LFTs were within normal limits, lipase within normal limits, CK within normal limits.  WBC mildly elevated.  -- Tylenol as needed.    -- Monitor symptoms and abdominal exam.  -- Consider imaging if pain worsens or persists.  -- CBC, BMP, Lactic  Acid in AM    Alcohol use disorder  History of alcohol withdrawal, with recent hospitalization  Hx Alcoholic steatosis/cirrhosis  Anxiety/depression  -- Patient was at [address] detox, discharged from there 2 days ago.  Mild tremors, but otherwise he reports no symptoms of withdrawal.  -- Unlikely for acute withdrawal to set in now as he has already completed detox and states he has not had alcohol since returning home, but will be cautious and place him on CIWA protocol with symptom triggered Valium dosing.  -- P.o. vitamins, folate, thiamine  -- Continue PTA fluoxetine and Vistaril  -- Monitor telemetry  -- Patient states he has a .  Offered psychiatry consult here, however it seems that he is connected already with an outpatient provider.   -- Care transition/SW consulted to ensure that he is connected well for follow-up.    Diastolic CHF  Review of echocardiogram results in care everywhere from 5934-8635 showed EF of 65%.  -- BNP less helpful to assess exacerbation given his OMERO  -- Hold PTA lisinopril due to soft blood pressure at outside ER.  -- Patient currently receiving IV fluids.  Monitor volume status, intake and output.  Not on diuretics outpatient.  -- Ordered chest x-ray    Peripheral neuropathy  Likely related to alcohol use  -- Continue PTA gabapentin  -- P.o. vitamins as above    Wounds to bilateral first toes  It appears patient developed blisters that have opened on both of his big toes.  States this developed while he was at the detox facility as they would not allow him to wear shoes.  Wounds were covered with dressing on his arrival here.  Do not appear to be infected.    --Monitor    Facial rash  Patient has erythematous areas to his face with some scaling and flaking noted.  States that he does not know where this came from.  Somebody mentioned to him possible psoriasis.  He states it is not itchy, burning or painful.  -- Consider fungal infection or seborrheic dermatitis.   For now as it is not bothersome to the patient, would recommend close outpatient follow-up with primary care and topical treatment.    Diet: Regular Diet Adult  DVT Prophylaxis: Low Risk/Ambulatory with no VTE prophylaxis indicated   Fuentes Catheter: Not present  Code Status: Full Code, confirmed with patient at time of admission  Disposition Plan    Observation status, anticipate discharge in the next 24+ hours pending improvement in patient's renal function, correction of electrolytes, improvement of his strength/mobility, and tolerating adequate p.o./fluids, etc.    Entered: MARIAN Meadows 08/15/2022, 9:58 AM          The patient's care was discussed with the Bedside Nurse and Patient.    The patient has been discussed with Dr. Marte, who agrees with the assessment and plan at this time.     Securely message with the Vocera Web Console (learn more here)  Text page via [facility] Paging/Directory         MARIAN Meadows    Primary Care Physician   Dr. Micha Mcleod    Chief Complaint   Lightheadedness, renal failure    History is obtained from the patient and review of medical record.    History of Present Illness   Torin Colby Jr. is a 56 year old male with a past medical history of alcohol use disorder, cirrhosis, pancreatitis, CHF, anemia of chronic disease, polyneuropathy, kidney disease, and MRSA amongst other conditions who presented to [place] emergency department on 8/15/2022.    Patient was recently hospitalized for alcohol withdrawal 8/2-8/4 at an outside hospital.  It appears patient was discharged to [address] [place] [address], and was released 2 days ago.  While at home, patient reports he had not been eating and drinking much.  Felt lightheaded, did not have syncope or injury, though weakness with lightheadedness, diaphoresis/clamminess, nausea, and vomiting.  He also reports loose stools.  Denies any hematemesis, bloody stools, dark tarry stools.  He also reports some generalized  abdominal pain.  As his symptoms had not improved and became more concerning over time, this prompted him to call 911 and he was brought to the emergency department for further evaluation.     In the emergency department, patient was found to have evidence of found to have acute renal failure.  Midnight creatinine 2.95 from 1.03 1-week ago.  Negative alcohol.  No evidence of withdrawal.  His LFTs were within normal limits other than alk phos 992, lipase within normal limits, CK unremarkable.  He was noted to have a pulse of 100, /59.  He received 3 L of fluid at this time.  No shortness of breath and tolerating fluids well. He is making urine now, voiding 500 mL at a time suggesting against bladder outlet obstruction.  He received thiamine and folate given history of alcohol use disorder at outside hospital.    There were reported to be no beds through [facility], therefore patient placement was contacted for patient transfer here to [facility].  He has been registered for observation, volume resuscitation, possible renal ultrasound if creatinine fails to further improve.  Outside ER doctor anticipates patient will discharge back to home and not require placement.          Past Medical History    I have reviewed this patient's medical history and updated it with pertinent information if needed.     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 09/03/2020 07/28/2020     Colitis   Noted on CT 2020     Colon polyps 2015     Colon polyps 06/21/2018   X6     Congestive heart failure (HC)     COVID-19 02/01/2021     CVA (cerebral vascular accident) (HC)     DDD (degenerative disc disease), lumbar     Depression    mod     Diarrhea     Diastolic heart failure (HC)     Diverticulosis     Esophagitis   with gastritis     Foraminal stenosis of cervical region     Hematochezia     HTN (hypertension)     Hypokalemia     Hypomagnesemia 06/2021     Malnutrition of moderate degree (HC)     MI (myocardial infarction) (HC) 2019     MRSA (methicillin resistant staph aureus) culture positive 05/18/2021   +MRSA 03/08/2020 and 03/10/2020 BC,11/05/19, 08/24/19 (nares) 04/05/19 (sputum)     Neurocognitive disorder     Obesity     On home oxygen therapy     Pancreatitis     Polyneuropathy     Portal hypertensive gastropathy (HC)     Sepsis (HC)   Code event and long term stay 2019     Smoker     TBI (traumatic brain injury) (HC)   x3     Thrombocytopenia (HC)         Past Surgical History   I have reviewed this patient's surgical history and updated it with pertinent information if needed.       COLONOSCOPY 05/04/2015   polyps x4, ademona     COLONOSCOPY 06/21/2018   3 serrated polyps, 3 hyperplastic     COLONOSCOPY 11/06/2020     COLONOSCOPY 02/26/2021   1 polyp repeat 5 years     cyst removal chest     ESOPHAGOGASTRODUODENOSCOPY 06/20/2016   Impressions/Post-Op Diagnosis:     ESOPHAGOGASTRODUODENOSCOPY 03/05/2020   Impressions/Post-Op Diagnosis:     ESOPHAGOGASTRODUODENOSCOPY 10/18/2019   Impression: - LA Grade C acute and erosive esophagitis.     ESOPHAGOGASTRODUODENOSCOPY 02/26/2021   + gastritis on scope     INPATIENT LEFT DIRECT ANTERIOR TOTAL HIP ARTHROPLASTY. - Left 06/17/2022     TRACHEOSTOMY     tracheostomy removal     WISDOM TEETH EXTRACTION   X1     Social History   I have reviewed this patient's social history and updated it with pertinent information if needed.       Social History     Tobacco Use     Smoking status: Unknown If Ever Smoked   Substance Use Topics     Alcohol use: Yes     Drug use: Not Currently       Family History   I have reviewed this patient's family history and updated it with pertinent information if  needed.     Heart Disease Father   heart attack x2     Alcoholism Father     Cancer-prostate Paternal Grandfather     Diabetes Paternal Grandfather     Cancer-colon Mother     Medications   Prior to Admission Medications   Prescriptions Last Dose Informant Patient Reported? Taking?   FLUOXETINE HCL PO   Yes No   Sig: Take 20 mg by mouth daily   FOLIC ACID PO   Yes No   Sig: Take 1 mg by mouth daily   GABAPENTIN PO   Yes No   Sig: Take 400 mg by mouth 4 times daily   HydrOXYzine Pamoate (VISTARIL PO)   Yes No   Sig: Take 50 mg by mouth 4 times daily   LISINOPRIL PO   Yes No   Sig: Take 10 mg by mouth daily   MAGNESIUM OXIDE PO   Yes No   Sig: Take 500 mg by mouth daily   PANTOPRAZOLE SODIUM PO   Yes No   Sig: Take 40 mg by mouth   Riboflavin (VITAMIN B-2 PO)   Yes No   Sig: Take 250 mg by mouth daily   VITAMIN E PO   Yes No   Sig: Take 400 Units by mouth daily      Facility-Administered Medications: None     Allergies   No Known Allergies      Review of Systems   The 10 point Review of Systems is negative other than noted in the HPI.    Physical Exam   Temp: 98  F (36.7  C) Temp src: Oral BP: 126/78 Pulse: 108   Resp: 18 SpO2: 95 % O2 Device: None (Room air)    Vital Signs with Ranges  Temp:  [98  F (36.7  C)] 98  F (36.7  C)  Pulse:  [108] 108  Resp:  [18] 18  BP: (126)/(78) 126/78  SpO2:  [95 %] 95 %  0 lbs 0 oz    Constitutional: Awake, alert, cooperative, no apparent distress.   ENT: Normocephalic, without obvious abnormality, atraumatic, oropharynx with moist mucus membranes.  Eyes pupils are equal, round. extra occular movements grossly intact.  Sclera nonicteric.  Neck: Supple, symmetrical, trachea midline, no adenopathy.  Pulmonary: No increased work of breathing, good air exchange, clear to auscultation bilaterally, no crackles or wheezing.  Cardiovascular: Mildly tachycardic and regular, normal S1 and S2, and no murmur noted.  GI: Normal bowel sounds, soft, obese, mildly distended, diffusely tender to light  palpation, no rigidity.  Skin/Integumen: Warm, dry, nondiaphoretic.  Patient has dry skin on his face with some flaking and underlying erythema noted.  Neuro: CN II-XII grossly intact.  Moves all 4 extremities equally with normal strength.  Speech normal.  No facial droop.  Psych:  Alert and oriented x 3. Normal affect.  Extremities: No lower extremity edema noted, and distal pulses palpable.    Data   Data reviewed today:  I personally reviewed all labs and imaging results that are available in care everywhere.  Results are in the [facility] system tab.  Otherwise notable results as reported in the HPI.

## 2022-08-16 ENCOUNTER — APPOINTMENT (OUTPATIENT)
Dept: PHYSICAL THERAPY | Facility: CLINIC | Age: 57
End: 2022-08-16
Attending: INTERNAL MEDICINE
Payer: MEDICARE

## 2022-08-16 VITALS
SYSTOLIC BLOOD PRESSURE: 126 MMHG | RESPIRATION RATE: 16 BRPM | OXYGEN SATURATION: 98 % | BODY MASS INDEX: 36.51 KG/M2 | TEMPERATURE: 98 F | DIASTOLIC BLOOD PRESSURE: 89 MMHG | HEIGHT: 71 IN | WEIGHT: 260.8 LBS | HEART RATE: 78 BPM

## 2022-08-16 LAB
ANION GAP SERPL CALCULATED.3IONS-SCNC: 4 MMOL/L (ref 3–14)
BUN SERPL-MCNC: 13 MG/DL (ref 7–30)
CALCIUM SERPL-MCNC: 8.1 MG/DL (ref 8.5–10.1)
CHLORIDE BLD-SCNC: 113 MMOL/L (ref 94–109)
CO2 SERPL-SCNC: 21 MMOL/L (ref 20–32)
CREAT SERPL-MCNC: 1 MG/DL (ref 0.66–1.25)
ERYTHROCYTE [DISTWIDTH] IN BLOOD BY AUTOMATED COUNT: 14.4 % (ref 10–15)
GFR SERPL CREATININE-BSD FRML MDRD: 88 ML/MIN/1.73M2
GLUCOSE BLD-MCNC: 111 MG/DL (ref 70–99)
HCT VFR BLD AUTO: 32.3 % (ref 40–53)
HGB BLD-MCNC: 11.1 G/DL (ref 13.3–17.7)
LACTATE SERPL-SCNC: 0.6 MMOL/L (ref 0.7–2)
MCH RBC QN AUTO: 31.4 PG (ref 26.5–33)
MCHC RBC AUTO-ENTMCNC: 34.4 G/DL (ref 31.5–36.5)
MCV RBC AUTO: 92 FL (ref 78–100)
PLATELET # BLD AUTO: 97 10E3/UL (ref 150–450)
POTASSIUM BLD-SCNC: 3.4 MMOL/L (ref 3.4–5.3)
RBC # BLD AUTO: 3.53 10E6/UL (ref 4.4–5.9)
SODIUM SERPL-SCNC: 138 MMOL/L (ref 133–144)
WBC # BLD AUTO: 4.5 10E3/UL (ref 4–11)

## 2022-08-16 PROCEDURE — 36415 COLL VENOUS BLD VENIPUNCTURE: CPT | Performed by: PHYSICIAN ASSISTANT

## 2022-08-16 PROCEDURE — 80048 BASIC METABOLIC PNL TOTAL CA: CPT | Performed by: PHYSICIAN ASSISTANT

## 2022-08-16 PROCEDURE — 83605 ASSAY OF LACTIC ACID: CPT | Performed by: PHYSICIAN ASSISTANT

## 2022-08-16 PROCEDURE — G0378 HOSPITAL OBSERVATION PER HR: HCPCS

## 2022-08-16 PROCEDURE — 250N000013 HC RX MED GY IP 250 OP 250 PS 637: Performed by: PHYSICIAN ASSISTANT

## 2022-08-16 PROCEDURE — 85014 HEMATOCRIT: CPT | Performed by: PHYSICIAN ASSISTANT

## 2022-08-16 PROCEDURE — 97116 GAIT TRAINING THERAPY: CPT | Mod: GP | Performed by: PHYSICAL THERAPIST

## 2022-08-16 PROCEDURE — 99217 PR OBSERVATION CARE DISCHARGE: CPT | Performed by: INTERNAL MEDICINE

## 2022-08-16 PROCEDURE — 97530 THERAPEUTIC ACTIVITIES: CPT | Mod: GP | Performed by: PHYSICAL THERAPIST

## 2022-08-16 PROCEDURE — 97161 PT EVAL LOW COMPLEX 20 MIN: CPT | Mod: GP | Performed by: PHYSICAL THERAPIST

## 2022-08-16 PROCEDURE — 96361 HYDRATE IV INFUSION ADD-ON: CPT

## 2022-08-16 RX ADMIN — HYDROXYZINE PAMOATE 50 MG: 25 CAPSULE ORAL at 08:13

## 2022-08-16 RX ADMIN — PANTOPRAZOLE SODIUM 40 MG: 40 TABLET, DELAYED RELEASE ORAL at 08:12

## 2022-08-16 RX ADMIN — THIAMINE HCL TAB 100 MG 100 MG: 100 TAB at 08:12

## 2022-08-16 RX ADMIN — GABAPENTIN 400 MG: 300 CAPSULE ORAL at 11:24

## 2022-08-16 RX ADMIN — MULTIPLE VITAMINS W/ MINERALS TAB 1 TABLET: TAB at 08:12

## 2022-08-16 RX ADMIN — ACETAMINOPHEN 650 MG: 325 TABLET ORAL at 08:12

## 2022-08-16 RX ADMIN — FLUOXETINE 20 MG: 20 CAPSULE ORAL at 08:12

## 2022-08-16 RX ADMIN — GABAPENTIN 400 MG: 300 CAPSULE ORAL at 08:12

## 2022-08-16 RX ADMIN — HYDROXYZINE PAMOATE 50 MG: 25 CAPSULE ORAL at 11:24

## 2022-08-16 RX ADMIN — FOLIC ACID 1 MG: 1 TABLET ORAL at 08:12

## 2022-08-16 ASSESSMENT — ACTIVITIES OF DAILY LIVING (ADL)
ADLS_ACUITY_SCORE: 34
ADLS_ACUITY_SCORE: 33
ADLS_ACUITY_SCORE: 34
ADLS_ACUITY_SCORE: 33

## 2022-08-16 NOTE — PLAN OF CARE
Physical Therapy Discharge Summary    Reason for therapy discharge:    Discharged to home.    Progress towards therapy goal(s). See goals on Care Plan in [facility] electronic health record for goal details.  Goals met    Therapy recommendation(s):    Continue home exercise program.

## 2022-08-16 NOTE — PROVIDER NOTIFICATION
MD Notification    Notified Person: MD    Notified Person Name: Dr. Marte    Notification Date/Time: 8/16/22 0430    Notification Interaction: paged    Purpose of Notification: Pt is a little unsteady on his feet. Says it has been this way for a long time. Do you want PT to see him before he discharges?     Orders Received: PT ordered and then discharge

## 2022-08-16 NOTE — PROGRESS NOTES
Observation goals  PRIOR TO DISCHARGE       Comments:   -diagnostic tests and consults completed and resulted: met  -vital signs normal or at patient baseline: Met  -tolerating oral intake to maintain hydration: Met  -returns to baseline functional status: Partially Met  -safe disposition plan has been identified: Not Met    Nurse to notify provider when observation goals have been met and patient is ready for discharge.

## 2022-08-16 NOTE — CONSULTS
Care Management Initial Consult    Per Epic Note:  56 year old male with a past medical history of alcohol use disorder, alcoholic steatosis/cirrhosis, pancreatitis, CHF, anemia of chronic disease, polyneuropathy, kidney disease, and MRSA amongst other conditions who presented to [place] emergency department on 8/15/2022. Patient was recently hospitalized for alcohol withdrawal 8/2-8/4 at an outside hospital.  It appears patient was discharged to [address] [place] [address], and was released 2 days ago.  While at home, patient reports he had not been eating and drinking much. Felt lightheaded, did not have syncope or injury, though weakness with lightheadedness led him to call 911 and present to the ER.  He was found to have acute kidney injury and dehydration.        General Information  Assessment completed with: Chart Review, patient, his mother Briseida        Primary Care Provider verified and updated as needed:  Yes,  Dr Cece Cooper ([place])  Readmission within the last 30 days:   yes        Advance Care Planning:  No ACP Documents on File          Communication Assessment  Patient's communication style: spoken language (English or Bilingual)    Hearing Difficulty or Deaf: no        Cognitive  Cognitive/Neuro/Behavioral: WDL, alert and oriented x4                     Living Environment:   People in home:   Lives alone    Current living Arrangements: apartment with elevator       Able to return to prior arrangements:  Yes, with support of his family and F[facility]TIMA ( and RN)       Family/Social Support:  Care provided by:  Self, with support from his family.  Provides care for: no one                 Description of Support System:   His parents live close by. His mother does his grocery shopping for him. He has a brother and sister who also help out as needed. Supportive, involved.        Current Resources:   Patient receiving home care services:  No     Community Resources:  F[facility]  (Managed Care  Organization for patient's on Medicaid), Lucero BERKOWITZ is the Community  with [facility]. [phone]. He also has a RN, Lian [phone] also with [facility].    Equipment currently used at home:  Rolling walker   Supplies currently used at home:  none    Employment/Financial:  Employment Status:   Disabled     Financial Concerns: No, fixed income, has Medical Insurance MEDICA/MEDICA CHOICE [employer]            Lifestyle & Psychosocial Needs:  Social Determinants of Health     Tobacco Use: Not on file   Alcohol Use: Not on file   Financial Resource Strain: Not on file   Food Insecurity: Not on file   Transportation Needs: Not on file   Physical Activity: Not on file   Stress: Not on file   Social Connections: Not on file   Intimate Partner Violence: Not on file   Depression: Not on file   Housing Stability: Not on file       Functional Status:  Prior to admission patient needed assistance:  Independent in all ADL's except for ambulation. He uses a rolling walker because of his neuropathy.  Patient considers himself Homebound. He says he has really bad neuropathy and it's hard for him to walk anywhere. He said he gets his alcohol by having it delivered to his apartment. Occasionally he gets out and goes grocery shopping with his mother. He has recent wounds to his toes bilaterally. He said he got these when he was at the recent Detox Center because he was not allowed to wear shoes. His mother helps him with cooking/cleaning/laundry.             Mental Health Status:   History of Anxiety and Depression      Neurocognitive Disorder       Chemical Dependency Status:   History of alcohol use disorder, alcoholic steatosis/cirrhosis. He has recently been to a Detox Center ([address] [place] [address],), He also has been in treatment at [facility] in [place], [place]      Patient's  Lucero from [facility] is planning for him to go to Inpatient for 3 months at [place], [place]. Patient has agreed to this.       Values/Beliefs:  Spiritual, Cultural Beliefs, Restoration Practices, Values that affect care:  None listed               Additional Information:  Writer informed that patient has been medically cleared to discharge home today. Writer met with patient at bedside. Introduced self and role as a Care Coordinator. Patient lives in [facility]. He lives alone in an apartment with an elevator. His parents live close by and do a lot for patient. Patient was transferred to [facility] from [facility] because of no bed availability in the [facility] system. Patient will need transportation arranged today for discharge home. Patient informed writer he is anxious to get home and start his Inpatient Alcohol Treatment at [facility]. He said he was there before and hopes it works this time. His  Lucero from [facility] will be organizing this. Patient belongs to [facility] which is a Managed Care Organization for patients on Medicaid for disabled. He is followed closely by a  and an RN. Lucero is the  and patient has been trying to get in touch with her for transportation home. Writer called the main number for [facility] and was able to reach patient's assigned RN, Lian ([phone]). She currently is working on the transportation and will call writer when set up. Patient feels he has adequate support when he discharges home today. His parents, especially his mother are always available to help him. He also has siblings close by to help also if needed. Lian also has requested orders to be sent to her.  Writer faxed to (fax # [phone]).     Addendum: Writer received a call from BRIAN Beal at [facility]. Transportation has been arranged through [employer] ([phone]). The  will be coming from [facility]. Writer spoke with  Jaylan and he was instructed to pick patient up at [address]. ETA is 1330.     No further discharge needs. Writer will update Epic with patient's new  PCP.      Nicole William, RN  Care Coordinator  [phone]

## 2022-08-16 NOTE — PLAN OF CARE
Orientation/Cognitive: A&Ox4  Observation Goals (Met/ Not Met): met  Mobility Level/Assist Equipment: SBA, GB, Walker  Fall Risk (Y/N): yes  Behavior Concerns: none  Pain Management: HA managed with tylenol  Tele/VS/O2: VSSMichi PRATT. TELE: NSR  ABNL Lab/BG: Hgb 11.1  Diet: regular  Bowel/Bladder: continent   Skin Concerns: psoriasis, scabs to bilateral great toes  Drains/Devices: PIV removed  Tests/Procedures for next shift: none  Anticipated DC date & active delays: today  Discharge instructions reviewed with patient, ride arranged by CC.

## 2022-08-16 NOTE — PLAN OF CARE
Orientation/Cognitive: AO X4 CIWA 2  Observation Goals (Met/ Not Met): Not Met  Mobility Level/Assist Equipment: SBA GB  Fall Risk (Y/N): Yes  Behavior Concerns: None  Pain Management: Tylenol for Head ache given x1  Tele/VS/O2: tele is NSR. VSS on RA   ABNL Lab/BG: Cr 1.78, K 3.3 replaced AM recheck  Diet: Regular   Bowel/Bladder: Continent  Skin Concerns: scabs to bilateral big toe, Psoriasis to face  Drains/Devices: Infusing D5  ml/hr  Tests/Procedures for next shift: None  Anticipated DC date & active delays: 8/16    Observation goals  PRIOR TO DISCHARGE       Comments:   -diagnostic tests and consults completed and resulted: Not Met  -vital signs normal or at patient baseline: Met  -tolerating oral intake to maintain hydration: Met  -returns to baseline functional status: Partially Met  -safe disposition plan has been identified: Not Met  Nurse to notify provider when observation goals have been met and patient is ready for discharge.

## 2022-08-16 NOTE — PROGRESS NOTES
"   08/16/22 1000   Quick Adds   Type of Visit Initial PT Evaluation       Present no   Living Environment   People in Home alone   Current Living Arrangements apartment   Home Accessibility no concerns   Transportation Anticipated family or friend will provide   Living Environment Comments Pt lives alone in an apartment. No concerns regarding stairs. Pt reports a family member will pick him up upon discharge. Pt reports he plans on returning to his apartment alone but has access to assist if needed.   Self-Care   Usual Activity Tolerance good   Current Activity Tolerance good   Regular Exercise No   Equipment Currently Used at Home walker, rolling   Fall history within last six months yes   Number of times patient has fallen within last six months 2   Activity/Exercise/Self-Care Comment Pt reports being IND at baseline with all ADLs. Pt reports ambulating with a FWW at baseline. Pt reports being able to ambulate ~200' w/ FWW at baseline. Pt does not drive.   General Information   Onset of Illness/Injury or Date of Surgery 08/16/22   Referring Physician Kirsty Marte MD   Patient/Family Therapy Goals Statement (PT) \"To go home\"   Pertinent History of Current Problem (include personal factors and/or comorbidities that impact the POC) Per Chart:   Patient is a 56 y.o. male who was recently hospitalized for alcohol withdrawal 8/2-8/4 at an outside hospital.  It appears patient was discharged to [address] [place] [address], and was released 2 days ago.  While at home, patient reports he had not been eating and drinking much.  Felt lightheaded, did not have syncope or injury, though weakness with lightheadedness led him to call 911 and present to the ER.    He was found to have acute kidney injury and dehydration.  There were reported to be no beds through [facility], therefore patient placement was contacted for patient transfer here to [facility].  He has been registered for observation, volume " resuscitation, and close monitoring.   Existing Precautions/Restrictions fall   Weight-Bearing Status - LLE full weight-bearing   Weight-Bearing Status - RLE full weight-bearing   Cognition   Orientation Status (Cognition) oriented x 3   Pain Assessment   Patient Currently in Pain No   Integumentary/Edema   Integumentary/Edema no deficits were identifed   Posture    Posture Forward head position;Protracted shoulders   Range of Motion (ROM)   Range of Motion ROM is WFL   Strength (Manual Muscle Testing)   Strength (Manual Muscle Testing) Able to perform R SLR;Able to perform L SLR   Bed Mobility   Comment, (Bed Mobility) Supine>sit w/ mod I   Transfers   Comment, (Transfers) Sit>stand w/ FWW and SBA   Gait/Stairs (Locomotion)   [place] Level (Gait) supervision   Assistive Device (Gait) walker, front-wheeled   Distance in Feet (Required for LE Total Joints) 250'  (10' eval)   Comment, (Gait/Stairs) Pt ambulated ~10' w/ FWW and SBA for eval.   Balance   Balance Comments Pt able to sit at EOB unsupported without LOB. Pt ambulates using a FWW and is steady.   Sensory Examination   Sensory Perception patient reports no sensory changes   Clinical Impression   Criteria for Skilled Therapeutic Intervention Yes, treatment indicated   PT Diagnosis (PT) Impaired gait   Influenced by the following impairments Decreased activity tolerance; decreased balance; decreased strength   Functional limitations due to impairments Impaired functional mobility   Clinical Presentation (PT Evaluation Complexity) Stable/Uncomplicated   Clinical Presentation Rationale Clinical Judgement   Clinical Decision Making (Complexity) low complexity   Planned Therapy Interventions (PT) balance training;bed mobility training;gait training;patient/family education;strengthening;transfer training   Risk & Benefits of therapy have been explained evaluation/treatment results reviewed;risks/benefits reviewed;care plan/treatment goals  reviewed;current/potential barriers reviewed;participants voiced agreement with care plan;patient;participants included   PT Discharge Planning   PT Discharge Recommendation (DC Rec) home;home with assist   PT Rationale for DC Rec Pt appears at/near baseline for functional mobility. Pt demonstrates safe and effective techniques with all functional mobility. Anticipate at time of discharge pt will be able to safely return to his apartment. Pt reports he has access to assist if needed.   PT Brief overview of current status Supine>sit w/ Mod I; sit>stand w/ FWW and SBA; gait w/ FWW and SBA   Total Evaluation Time   Total Evaluation Time (Minutes) 10   Physical Therapy Goals   PT Frequency One time eval and treatment only   PT Predicted Duration/Target Date for Goal Attainment 08/19/22   PT Goals Bed Mobility;Transfers;Gait   PT: Bed Mobility Supervision/stand-by assist;Supine to/from sit;Goal Met   PT: Transfers Supervision/stand-by assist;Sit to/from stand;Assistive device;Goal Met   PT: Gait Supervision/stand-by assist;Assistive device;150 feet;Goal Met

## 2022-08-16 NOTE — DISCHARGE SUMMARY
[facility]  Hospitalist Discharge Summary      Date of Admission:  8/15/2022  Date of Discharge:  8/16/2022  Discharging Provider: Kirsty Marte MD,FACP  Discharge Service: Hospitalist Service    Discharge Diagnoses   Acute kidney injury  Hypokalemia  Generalized weakness and dizziness  Abdominal pain, Nausea/vomiting  Alcohol use disorder  History of alcohol withdrawal, with recent hospitalization  Hx Alcoholic steatosis/cirrhosis  Anxiety/depression  Diastolic CHF   Peripheral neuropathy  Wounds to bilateral first toes  Facial rash    Follow-ups Needed After Discharge   Follow-up Appointments     Follow-up and recommended labs and tests      Follow up with primary care provider, Micha Mcleod, within 7 days for   hospital follow- up.  The following labs/tests are recommended: BMP.           Unresulted Labs Ordered in the Past 30 Days of this Admission     No orders found for last 31 day(s).          Discharge Disposition   Discharged to home  Condition at discharge: Stable    Hospital Course   Torin Colby Jr. is a 56 year old male with a past medical history of alcohol use disorder, alcoholic steatosis/cirrhosis, pancreatitis, CHF, anemia of chronic disease, polyneuropathy, kidney disease, and MRSA amongst other conditions who presented to [place] emergency department on 8/15/2022.     Patient was recently hospitalized for alcohol withdrawal 8/2-8/4 at an outside hospital.  It appears patient was discharged to [address] [place] [address], and was released 2 days ago.  While at home, patient reports he had not been eating and drinking much.  Felt lightheaded, did not have syncope or injury, though weakness with lightheadedness led him to call 911 and present to the ER.    He was found to have acute kidney injury and dehydration.  There were reported to be no beds through [facility], therefore patient placement was contacted for patient transfer here to [facility].  He has been  registered for observation, volume resuscitation, and close monitoring.    Here are further details regarding her current hospitalization:    Acute kidney injury  Hypokalemia    --Patient received 3 L IV fluids at outside hospital prior to arrival.  Was continued on IV hydration with lactated Ringer  --Repeat labs showed normal creatinine on the day of discharge   -- Plan to discharge patient home with repeat BMP in one week with PCP follow up     Generalized weakness and dizziness  -- Checked orthostatics  -- Hydrated and replaced electrolytes  -- Patient ambulated with staff present, PT assessed the patient before patient was discharged      Abdominal pain, Nausea/vomiting  -- Ethanol level negative.  His LFTs were within normal limits, lipase within normal limits, CK within normal limits.  WBC mildly elevated.  -- Tylenol as needed.    -- Monitored symptoms and abdominal exam, symptoms improved , patient was able to tolerate oral intake before the discharge     Alcohol use disorder  History of alcohol withdrawal, with recent hospitalization  Hx Alcoholic steatosis/cirrhosis  Anxiety/depression    -- Patient was at [facility] detox, discharged from there 2 days ago.  Mild tremors, but otherwise he reports no symptoms of withdrawal.  -- Unlikely for acute withdrawal to set in now as he has already completed detox and states he has not had alcohol since returning home, but will be cautious and place him on CIWA protocol with symptom triggered Valium dosing.  -- P.o. vitamins, folate, thiamine will be continued on dischargee  -- Continue PTA fluoxetine and Vistaril  -- Patient states he has a .  Offered psychiatry consult here, however it seems that he is connected already with an outpatient provider.   -- Care transition/SW were consulted to ensure that he is connected well for follow-up.     Diastolic CHF  Review of echocardiogram results in care everywhere from [id number] showed EF of 65%.  -- BNP  less helpful to assess exacerbation given his OMERO  -- Held PTA lisinopril due to soft blood pressure at outside ER.  -- resumed on discharge     Peripheral neuropathy  Likely related to alcohol use  -- Continue PTA gabapentin  -- P.o. vitamins as above     Wounds to bilateral first toes  It appears patient developed blisters that have opened on both of his big toes.  States this developed while he was at the detox facility as they would not allow him to wear shoes.  Wounds were covered with dressing on his arrival here.  Do not appear to be infected.    -- Monitor, stable     Facial rash  Patient has erythematous areas to his face with some scaling and flaking noted.  States that he does not know where this came from.  Somebody mentioned to him possible psoriasis.  He states it is not itchy, burning or painful.  -- Consider fungal infection or seborrheic dermatitis.  For now as it is not bothersome to the patient, would recommend close outpatient follow-up with primary care and topical treatment.    Patient was seen and examined on the day of discharge , he is feeling well, does not have any complaints , I did review the discharge medications and instructions with the patient and plan for him to follow up with the PCP after the hospitalization .patient was in agreement , he is discharged in stable condition back to his home.      Consultations This Hospital Stay   CARE MANAGEMENT / SOCIAL WORK IP CONSULT  PHYSICAL THERAPY ADULT IP CONSULT    Code Status   Full Code    Time Spent on this Encounter   I, Kirsty Marte MD, personally saw the patient today and spent less than or equal to 30 minutes discharging this patient.     Kirsty Marte MD, [facility]  [facility] EXTENDED RECOVERY AND SHORT STAY  [address] [facility] [address]  Phone: [phone]  ______________________________________________________________________    Physical Exam   Vital Signs: Temp: 97.5  F (36.4  C) Temp src: Oral BP:  107/68 Pulse: 69   Resp: 16 SpO2: 94 % O2 Device: None (Room air)    Weight: 260 lbs 12.8 oz    Physical Exam  Vitals and nursing note reviewed.   Constitutional:       Appearance: He is well-developed.   HENT:      Head: Normocephalic and atraumatic.   Eyes:      Pupils: Pupils are equal, round, and reactive to light.   Neck:      Thyroid: No thyromegaly.   Cardiovascular:      Rate and Rhythm: Normal rate and regular rhythm.      Heart sounds: Normal heart sounds.   Pulmonary:      Effort: Pulmonary effort is normal. No respiratory distress.      Breath sounds: Normal breath sounds.   Abdominal:      General: Bowel sounds are normal. There is no distension.      Palpations: Abdomen is soft.   Musculoskeletal:         General: No tenderness. Normal range of motion.      Cervical back: Normal range of motion and neck supple.   Skin:     General: Skin is warm and dry.   Neurological:      Mental Status: He is alert and oriented to person, place, and time.   Psychiatric:         Behavior: Behavior normal.          Primary Care Physician   Micha Mcleod    Discharge Orders      Reason for your hospital stay    You were admitted to the hospital secondary to  acute renal failure from dehydration which is improved now     Follow-up and recommended labs and tests    Follow up with primary care provider, Micha Mcleod, within 7 days for hospital follow- up.  The following labs/tests are recommended: BMP.     Activity    Your activity upon discharge: activity as tolerated and no driving for today     Discharge Instructions    Please continue taking all your home medications except your lisinopril which is your BP medications after the discharge . Please check the BP on daily basis and then take those readings to your PCP. Your PCP will assess if you need to be started back on lisinopril .Please , continue to take all the other home medications and stay abstinent from alcohol     Diet    Follow this diet upon discharge:  Orders Placed This Encounter      Regular Diet Adult         Significant Results and Procedures   Results for orders placed or performed during the hospital encounter of 08/15/22   XR Chest Port 1 View    Narrative    XR CHEST PORT 1 VIEW  8/15/2022 1:10 PM       INDICATION: productive cough, Hx CHF  COMPARISON: 5/11/2019       Impression    IMPRESSION: Mild cardiomegaly. Pulmonary vascularity is within normal  limits. No pulmonary edema. The lungs are clear.    DARIA CROOK MD         SYSTEM ID:  [id number]       Discharge Medications   Current Discharge Medication List      CONTINUE these medications which have NOT CHANGED    Details   FLUOXETINE HCL PO Take 20 mg by mouth daily      FOLIC ACID PO Take 1 mg by mouth daily      GABAPENTIN PO Take 400 mg by mouth 4 times daily      HydrOXYzine Pamoate (VISTARIL PO) Take 50 mg by mouth 4 times daily      MAGNESIUM OXIDE PO Take 500 mg by mouth daily      PANTOPRAZOLE SODIUM PO Take 40 mg by mouth daily      Riboflavin (VITAMIN B-2 PO) Take 250 mg by mouth daily      VITAMIN E PO Take 400 Units by mouth daily         STOP taking these medications       LISINOPRIL PO Comments:   Reason for Stopping:             Allergies   No Known Allergies

## 2022-08-16 NOTE — PLAN OF CARE
Observation goals  PRIOR TO DISCHARGE       Comments: -diagnostic tests and consults completed and resulted: Not Met  -vital signs normal or at patient baseline: Partially Met, HR tachy  -tolerating oral intake to maintain hydration: Partially Met  -returns to baseline functional status: Not Met  -safe disposition plan has been identified: Not Met  Nurse to notify provider when observation goals have been met and patient is ready for discharge.

## 2022-08-16 NOTE — PROVIDER NOTIFICATION
MD Notification    Notified Person: MD    Notified Person Name: Dr. Marte    Notification Date/Time: 8/16/22 0811    Notification Interaction: paged provider    Purpose of Notification: IVF bag almost out. Cr normal today 1.0, can we stop IVFs?     Orders Received: okay to stop IVFs    Comments:

## 2022-09-06 NOTE — PLAN OF CARE
[facility]      OUTPATIENT PHYSICAL THERAPY EVALUATION  PLAN OF TREATMENT FOR OUTPATIENT REHABILITATION  (COMPLETE FOR INITIAL CLAIMS ONLY)  Patient's Last Name, First Name, M.I.  YOB: 1965  Torin Colby                        Provider's Name  [facility] Medical Record No.  [id number]                               Onset Date:  08/16/22   Start of Care Date:         Type:     _X_PT   ___OT   ___SLP Medical Diagnosis:                           PT Diagnosis:  Impaired gait   Visits from SOC:  1   _________________________________________________________________________________  Plan of Treatment/Functional Goals    Planned Interventions: balance training, bed mobility training, gait training, patient/family education, strengthening, transfer training     Goals: See Physical Therapy Goals on Care Plan in [facility] electronic health record.    Therapy Frequency: One time eval and treatment only  Predicted Duration of Therapy Intervention: 08/19/22  _________________________________________________________________________________    I CERTIFY THE NEED FOR THESE SERVICES FURNISHED UNDER        THIS PLAN OF TREATMENT AND WHILE UNDER MY CARE     (Physician co-signature of this document indicates review and certification of the therapy plan).               ,      Referring Physician: Kirsty Marte MD            Initial Assessment        See Physical Therapy evaluation dated   in Epic electronic health record.   well developed, well nourished , in no acute distress , ambulating without difficulty , normal communication ability

## 2022-11-20 ENCOUNTER — HEALTH MAINTENANCE LETTER (OUTPATIENT)
Age: 57
End: 2022-11-20

## 2023-01-02 ENCOUNTER — MEDICAL CORRESPONDENCE (OUTPATIENT)
Dept: HEALTH INFORMATION MANAGEMENT | Facility: CLINIC | Age: 58
End: 2023-01-02

## 2023-01-02 ENCOUNTER — TELEPHONE (OUTPATIENT)
Dept: FAMILY MEDICINE | Facility: CLINIC | Age: 58
End: 2023-01-02

## 2023-01-02 NOTE — TELEPHONE ENCOUNTER
Received a fax with a referral from Nephrology to call the patient to set up an appointment with Dr. Mcleod on Jan 11.    I called the patient to set up the appointment and he stated he did not want to be seen and to not schedule the visit.

## 2023-04-21 RX ORDER — HYDROXYZINE HYDROCHLORIDE 50 MG/1
50 TABLET, FILM COATED ORAL 4 TIMES DAILY PRN
Qty: 120 TABLET | Refills: 0 | OUTPATIENT
Start: 2023-04-21

## 2023-04-21 RX ORDER — CHOLECALCIFEROL (VITAMIN D3) 50 MCG
1 TABLET ORAL DAILY
Qty: 30 TABLET | Refills: 0 | OUTPATIENT
Start: 2023-04-21

## 2023-04-21 NOTE — TELEPHONE ENCOUNTER
This refill request is a duplicate request, previously received or sent.  Sent denial notification to pharmacy.  Sheryl REYES RN  [facility]

## 2023-11-25 ENCOUNTER — HEALTH MAINTENANCE LETTER (OUTPATIENT)
Age: 58
End: 2023-11-25

## 2024-12-29 ENCOUNTER — HEALTH MAINTENANCE LETTER (OUTPATIENT)
Age: 59
End: 2024-12-29